# Patient Record
Sex: MALE | Race: WHITE | NOT HISPANIC OR LATINO | Employment: FULL TIME | URBAN - METROPOLITAN AREA
[De-identification: names, ages, dates, MRNs, and addresses within clinical notes are randomized per-mention and may not be internally consistent; named-entity substitution may affect disease eponyms.]

---

## 2022-10-03 ENCOUNTER — APPOINTMENT (OUTPATIENT)
Dept: RADIOLOGY | Facility: MEDICAL CENTER | Age: 52
DRG: 434 | End: 2022-10-03
Attending: HOSPITALIST
Payer: COMMERCIAL

## 2022-10-03 ENCOUNTER — HOSPITAL ENCOUNTER (INPATIENT)
Facility: MEDICAL CENTER | Age: 52
LOS: 1 days | DRG: 434 | End: 2022-10-05
Attending: EMERGENCY MEDICINE | Admitting: HOSPITALIST
Payer: COMMERCIAL

## 2022-10-03 ENCOUNTER — APPOINTMENT (OUTPATIENT)
Dept: RADIOLOGY | Facility: MEDICAL CENTER | Age: 52
DRG: 434 | End: 2022-10-03
Attending: EMERGENCY MEDICINE
Payer: COMMERCIAL

## 2022-10-03 DIAGNOSIS — K76.82 HEPATIC ENCEPHALOPATHY (HCC): ICD-10-CM

## 2022-10-03 DIAGNOSIS — K85.90 ACUTE PANCREATITIS WITHOUT INFECTION OR NECROSIS, UNSPECIFIED PANCREATITIS TYPE: ICD-10-CM

## 2022-10-03 DIAGNOSIS — K70.31 ASCITES DUE TO ALCOHOLIC CIRRHOSIS (HCC): ICD-10-CM

## 2022-10-03 DIAGNOSIS — K74.60 CIRRHOSIS OF LIVER WITH ASCITES, UNSPECIFIED HEPATIC CIRRHOSIS TYPE (HCC): ICD-10-CM

## 2022-10-03 DIAGNOSIS — K70.31 ALCOHOLIC CIRRHOSIS OF LIVER WITH ASCITES (HCC): ICD-10-CM

## 2022-10-03 DIAGNOSIS — R18.8 CIRRHOSIS OF LIVER WITH ASCITES, UNSPECIFIED HEPATIC CIRRHOSIS TYPE (HCC): ICD-10-CM

## 2022-10-03 DIAGNOSIS — R41.0 CONFUSION: ICD-10-CM

## 2022-10-03 PROBLEM — K72.90 DECOMPENSATED LIVER DISEASE (HCC): Status: ACTIVE | Noted: 2022-10-03

## 2022-10-03 PROBLEM — K74.69 DECOMPENSATED LIVER DISEASE (HCC): Status: ACTIVE | Noted: 2022-10-03

## 2022-10-03 LAB
ALBUMIN SERPL BCP-MCNC: 2.9 G/DL (ref 3.2–4.9)
ALBUMIN SERPL BCP-MCNC: 3.1 G/DL (ref 3.2–4.9)
ALBUMIN/GLOB SERPL: 0.7 G/DL
ALP SERPL-CCNC: 77 U/L (ref 30–99)
ALT SERPL-CCNC: 17 U/L (ref 2–50)
AMMONIA PLAS-SCNC: 16 UMOL/L (ref 11–45)
ANION GAP SERPL CALC-SCNC: 10 MMOL/L (ref 7–16)
AST SERPL-CCNC: 40 U/L (ref 12–45)
BASOPHILS # BLD AUTO: 0.9 % (ref 0–1.8)
BASOPHILS # BLD: 0.09 K/UL (ref 0–0.12)
BILIRUB SERPL-MCNC: 3 MG/DL (ref 0.1–1.5)
BUN SERPL-MCNC: 12 MG/DL (ref 8–22)
CALCIUM SERPL-MCNC: 9.2 MG/DL (ref 8.5–10.5)
CHLORIDE SERPL-SCNC: 97 MMOL/L (ref 96–112)
CO2 SERPL-SCNC: 25 MMOL/L (ref 20–33)
CREAT SERPL-MCNC: 0.88 MG/DL (ref 0.5–1.4)
EKG IMPRESSION: NORMAL
EOSINOPHIL # BLD AUTO: 0.19 K/UL (ref 0–0.51)
EOSINOPHIL NFR BLD: 2 % (ref 0–6.9)
ERYTHROCYTE [DISTWIDTH] IN BLOOD BY AUTOMATED COUNT: 51.9 FL (ref 35.9–50)
GFR SERPLBLD CREATININE-BSD FMLA CKD-EPI: 104 ML/MIN/1.73 M 2
GLOBULIN SER CALC-MCNC: 4 G/DL (ref 1.9–3.5)
GLUCOSE SERPL-MCNC: 112 MG/DL (ref 65–99)
HCT VFR BLD AUTO: 39.9 % (ref 42–52)
HGB BLD-MCNC: 13.8 G/DL (ref 14–18)
IMM GRANULOCYTES # BLD AUTO: 0.03 K/UL (ref 0–0.11)
IMM GRANULOCYTES NFR BLD AUTO: 0.3 % (ref 0–0.9)
INR PPP: 1.5 (ref 0.87–1.13)
LIPASE SERPL-CCNC: 181 U/L (ref 11–82)
LYMPHOCYTES # BLD AUTO: 2.1 K/UL (ref 1–4.8)
LYMPHOCYTES NFR BLD: 21.6 % (ref 22–41)
MAGNESIUM SERPL-MCNC: 1.8 MG/DL (ref 1.5–2.5)
MCH RBC QN AUTO: 37.9 PG (ref 27–33)
MCHC RBC AUTO-ENTMCNC: 34.6 G/DL (ref 33.7–35.3)
MCV RBC AUTO: 109.6 FL (ref 81.4–97.8)
MONOCYTES # BLD AUTO: 0.9 K/UL (ref 0–0.85)
MONOCYTES NFR BLD AUTO: 9.2 % (ref 0–13.4)
NEUTROPHILS # BLD AUTO: 6.43 K/UL (ref 1.82–7.42)
NEUTROPHILS NFR BLD: 66 % (ref 44–72)
NRBC # BLD AUTO: 0 K/UL
NRBC BLD-RTO: 0 /100 WBC
PLATELET # BLD AUTO: 152 K/UL (ref 164–446)
PMV BLD AUTO: 9.2 FL (ref 9–12.9)
POTASSIUM SERPL-SCNC: 4.3 MMOL/L (ref 3.6–5.5)
PROT SERPL-MCNC: 6.9 G/DL (ref 6–8.2)
PROTHROMBIN TIME: 17.8 SEC (ref 12–14.6)
RBC # BLD AUTO: 3.64 M/UL (ref 4.7–6.1)
SODIUM SERPL-SCNC: 132 MMOL/L (ref 135–145)
TROPONIN T SERPL-MCNC: 11 NG/L (ref 6–19)
WBC # BLD AUTO: 9.7 K/UL (ref 4.8–10.8)

## 2022-10-03 PROCEDURE — A9270 NON-COVERED ITEM OR SERVICE: HCPCS | Performed by: EMERGENCY MEDICINE

## 2022-10-03 PROCEDURE — 700102 HCHG RX REV CODE 250 W/ 637 OVERRIDE(OP): Performed by: HOSPITALIST

## 2022-10-03 PROCEDURE — 82040 ASSAY OF SERUM ALBUMIN: CPT

## 2022-10-03 PROCEDURE — 96375 TX/PRO/DX INJ NEW DRUG ADDON: CPT

## 2022-10-03 PROCEDURE — 99285 EMERGENCY DEPT VISIT HI MDM: CPT

## 2022-10-03 PROCEDURE — 96374 THER/PROPH/DIAG INJ IV PUSH: CPT

## 2022-10-03 PROCEDURE — 85610 PROTHROMBIN TIME: CPT

## 2022-10-03 PROCEDURE — 71045 X-RAY EXAM CHEST 1 VIEW: CPT

## 2022-10-03 PROCEDURE — 93005 ELECTROCARDIOGRAM TRACING: CPT | Performed by: EMERGENCY MEDICINE

## 2022-10-03 PROCEDURE — 80053 COMPREHEN METABOLIC PANEL: CPT

## 2022-10-03 PROCEDURE — 93005 ELECTROCARDIOGRAM TRACING: CPT

## 2022-10-03 PROCEDURE — 82140 ASSAY OF AMMONIA: CPT

## 2022-10-03 PROCEDURE — G0378 HOSPITAL OBSERVATION PER HR: HCPCS

## 2022-10-03 PROCEDURE — A9270 NON-COVERED ITEM OR SERVICE: HCPCS | Performed by: HOSPITALIST

## 2022-10-03 PROCEDURE — 700102 HCHG RX REV CODE 250 W/ 637 OVERRIDE(OP): Performed by: EMERGENCY MEDICINE

## 2022-10-03 PROCEDURE — 36415 COLL VENOUS BLD VENIPUNCTURE: CPT

## 2022-10-03 PROCEDURE — 84484 ASSAY OF TROPONIN QUANT: CPT

## 2022-10-03 PROCEDURE — 99220 PR INITIAL OBSERVATION CARE,LEVL III: CPT | Performed by: HOSPITALIST

## 2022-10-03 PROCEDURE — 85025 COMPLETE CBC W/AUTO DIFF WBC: CPT

## 2022-10-03 PROCEDURE — 700111 HCHG RX REV CODE 636 W/ 250 OVERRIDE (IP): Performed by: HOSPITALIST

## 2022-10-03 PROCEDURE — 83690 ASSAY OF LIPASE: CPT

## 2022-10-03 PROCEDURE — 83735 ASSAY OF MAGNESIUM: CPT

## 2022-10-03 RX ORDER — FUROSEMIDE 10 MG/ML
40 INJECTION INTRAMUSCULAR; INTRAVENOUS
Status: DISCONTINUED | OUTPATIENT
Start: 2022-10-03 | End: 2022-10-05 | Stop reason: HOSPADM

## 2022-10-03 RX ORDER — PROMETHAZINE HYDROCHLORIDE 25 MG/1
12.5-25 SUPPOSITORY RECTAL EVERY 4 HOURS PRN
Status: DISCONTINUED | OUTPATIENT
Start: 2022-10-03 | End: 2022-10-05 | Stop reason: HOSPADM

## 2022-10-03 RX ORDER — POLYETHYLENE GLYCOL 3350 17 G/17G
1 POWDER, FOR SOLUTION ORAL
Status: DISCONTINUED | OUTPATIENT
Start: 2022-10-03 | End: 2022-10-05 | Stop reason: HOSPADM

## 2022-10-03 RX ORDER — CIPROFLOXACIN 500 MG/1
500 TABLET, FILM COATED ORAL DAILY
Status: ON HOLD | COMMUNITY
Start: 2022-09-24 | End: 2022-10-05

## 2022-10-03 RX ORDER — FUROSEMIDE 40 MG/1
40 TABLET ORAL DAILY
Status: ON HOLD | COMMUNITY
End: 2022-10-05 | Stop reason: SDUPTHER

## 2022-10-03 RX ORDER — ONDANSETRON 4 MG/1
4 TABLET, ORALLY DISINTEGRATING ORAL EVERY 4 HOURS PRN
Status: DISCONTINUED | OUTPATIENT
Start: 2022-10-03 | End: 2022-10-05 | Stop reason: HOSPADM

## 2022-10-03 RX ORDER — AMOXICILLIN 250 MG
2 CAPSULE ORAL 2 TIMES DAILY
Status: DISCONTINUED | OUTPATIENT
Start: 2022-10-03 | End: 2022-10-05 | Stop reason: HOSPADM

## 2022-10-03 RX ORDER — LACTULOSE 20 G/30ML
30 SOLUTION ORAL ONCE
Status: COMPLETED | OUTPATIENT
Start: 2022-10-03 | End: 2022-10-03

## 2022-10-03 RX ORDER — BISACODYL 10 MG
10 SUPPOSITORY, RECTAL RECTAL
Status: DISCONTINUED | OUTPATIENT
Start: 2022-10-03 | End: 2022-10-05 | Stop reason: HOSPADM

## 2022-10-03 RX ORDER — MIDODRINE HYDROCHLORIDE 5 MG/1
5 TABLET ORAL 3 TIMES DAILY
Status: DISCONTINUED | OUTPATIENT
Start: 2022-10-03 | End: 2022-10-05 | Stop reason: HOSPADM

## 2022-10-03 RX ORDER — SPIRONOLACTONE 100 MG/1
100 TABLET, FILM COATED ORAL DAILY
Status: ON HOLD | COMMUNITY
End: 2022-10-05 | Stop reason: SDUPTHER

## 2022-10-03 RX ORDER — ONDANSETRON 2 MG/ML
4 INJECTION INTRAMUSCULAR; INTRAVENOUS EVERY 4 HOURS PRN
Status: DISCONTINUED | OUTPATIENT
Start: 2022-10-03 | End: 2022-10-05 | Stop reason: HOSPADM

## 2022-10-03 RX ORDER — SPIRONOLACTONE 25 MG/1
100 TABLET ORAL DAILY
Status: DISCONTINUED | OUTPATIENT
Start: 2022-10-03 | End: 2022-10-05 | Stop reason: HOSPADM

## 2022-10-03 RX ORDER — OXYCODONE HYDROCHLORIDE 5 MG/1
5 TABLET ORAL EVERY 4 HOURS PRN
Status: DISCONTINUED | OUTPATIENT
Start: 2022-10-03 | End: 2022-10-05 | Stop reason: HOSPADM

## 2022-10-03 RX ORDER — PROMETHAZINE HYDROCHLORIDE 25 MG/1
12.5-25 TABLET ORAL EVERY 4 HOURS PRN
Status: DISCONTINUED | OUTPATIENT
Start: 2022-10-03 | End: 2022-10-05 | Stop reason: HOSPADM

## 2022-10-03 RX ORDER — PROCHLORPERAZINE EDISYLATE 5 MG/ML
5-10 INJECTION INTRAMUSCULAR; INTRAVENOUS EVERY 4 HOURS PRN
Status: DISCONTINUED | OUTPATIENT
Start: 2022-10-03 | End: 2022-10-05 | Stop reason: HOSPADM

## 2022-10-03 RX ORDER — ALBUTEROL SULFATE 90 UG/1
2 AEROSOL, METERED RESPIRATORY (INHALATION) EVERY 4 HOURS PRN
Status: DISCONTINUED | OUTPATIENT
Start: 2022-10-03 | End: 2022-10-05 | Stop reason: HOSPADM

## 2022-10-03 RX ORDER — MIDODRINE HYDROCHLORIDE 5 MG/1
5 TABLET ORAL 3 TIMES DAILY
Status: ON HOLD | COMMUNITY
End: 2022-10-05 | Stop reason: SDUPTHER

## 2022-10-03 RX ADMIN — LACTULOSE 30 ML: 20 SOLUTION ORAL at 18:49

## 2022-10-03 RX ADMIN — SPIRONOLACTONE 100 MG: 25 TABLET ORAL at 18:47

## 2022-10-03 RX ADMIN — RIFAXIMIN 550 MG: 550 TABLET ORAL at 22:21

## 2022-10-03 RX ADMIN — MIDODRINE HYDROCHLORIDE 5 MG: 5 TABLET ORAL at 18:49

## 2022-10-03 RX ADMIN — OXYCODONE 5 MG: 5 TABLET ORAL at 18:48

## 2022-10-03 RX ADMIN — OXYCODONE 5 MG: 5 TABLET ORAL at 23:04

## 2022-10-03 RX ADMIN — SENNOSIDES AND DOCUSATE SODIUM 2 TABLET: 50; 8.6 TABLET ORAL at 18:47

## 2022-10-03 RX ADMIN — FUROSEMIDE 40 MG: 10 INJECTION, SOLUTION INTRAMUSCULAR; INTRAVENOUS at 18:49

## 2022-10-03 RX ADMIN — ONDANSETRON 4 MG: 2 INJECTION INTRAMUSCULAR; INTRAVENOUS at 23:04

## 2022-10-03 ASSESSMENT — ENCOUNTER SYMPTOMS
FEVER: 0
HEADACHES: 0
CONSTIPATION: 0
TINGLING: 0
CHILLS: 0
FLANK PAIN: 0
DEPRESSION: 0
ABDOMINAL PAIN: 1
HEMOPTYSIS: 0
WEAKNESS: 0
VOMITING: 0
PALPITATIONS: 0
BRUISES/BLEEDS EASILY: 0
BLURRED VISION: 0
NAUSEA: 0
POLYDIPSIA: 0
DIZZINESS: 0
SHORTNESS OF BREATH: 0
WHEEZING: 0
FALLS: 0
HEARTBURN: 0
STRIDOR: 0
BLOOD IN STOOL: 0
PHOTOPHOBIA: 0
COUGH: 0
PND: 0
SINUS PAIN: 0
DIARRHEA: 0
BACK PAIN: 0
EYE PAIN: 0
MYALGIAS: 0
DIAPHORESIS: 0
SORE THROAT: 0
SPUTUM PRODUCTION: 0
CLAUDICATION: 0
ORTHOPNEA: 0
HALLUCINATIONS: 0
NECK PAIN: 0
TREMORS: 0
DOUBLE VISION: 0

## 2022-10-03 ASSESSMENT — LIFESTYLE VARIABLES
EVER HAD A DRINK FIRST THING IN THE MORNING TO STEADY YOUR NERVES TO GET RID OF A HANGOVER: NO
TOTAL SCORE: 0
EVER HAD A DRINK FIRST THING IN THE MORNING TO STEADY YOUR NERVES TO GET RID OF A HANGOVER: NO
TOTAL SCORE: 0
ON A TYPICAL DAY WHEN YOU DRINK ALCOHOL HOW MANY DRINKS DO YOU HAVE: 0
EVER FELT BAD OR GUILTY ABOUT YOUR DRINKING: NO
SUBSTANCE_ABUSE: 0
HAVE YOU EVER FELT YOU SHOULD CUT DOWN ON YOUR DRINKING: NO
TOTAL SCORE: 0
TOTAL SCORE: 0
DO YOU DRINK ALCOHOL: NO
EVER FELT BAD OR GUILTY ABOUT YOUR DRINKING: NO
ALCOHOL_USE: NO
EVER FELT BAD OR GUILTY ABOUT YOUR DRINKING: NO
CONSUMPTION TOTAL: INCOMPLETE
DOES PATIENT WANT TO STOP DRINKING: NO
CONSUMPTION TOTAL: NEGATIVE
ALCOHOL_USE: NO
TOTAL SCORE: 0
HAVE YOU EVER FELT YOU SHOULD CUT DOWN ON YOUR DRINKING: NO
TOTAL SCORE: 0
EVER HAD A DRINK FIRST THING IN THE MORNING TO STEADY YOUR NERVES TO GET RID OF A HANGOVER: NO
TOTAL SCORE: 0
HOW MANY TIMES IN THE PAST YEAR HAVE YOU HAD 5 OR MORE DRINKS IN A DAY: 0
HAVE YOU EVER FELT YOU SHOULD CUT DOWN ON YOUR DRINKING: NO
DOES PATIENT WANT TO STOP DRINKING: NO
TOTAL SCORE: 0
TOTAL SCORE: 0
HAVE PEOPLE ANNOYED YOU BY CRITICIZING YOUR DRINKING: NO
HAVE PEOPLE ANNOYED YOU BY CRITICIZING YOUR DRINKING: NO
AVERAGE NUMBER OF DAYS PER WEEK YOU HAVE A DRINK CONTAINING ALCOHOL: 0
HAVE PEOPLE ANNOYED YOU BY CRITICIZING YOUR DRINKING: NO
CONSUMPTION TOTAL: INCOMPLETE

## 2022-10-03 ASSESSMENT — PATIENT HEALTH QUESTIONNAIRE - PHQ9
1. LITTLE INTEREST OR PLEASURE IN DOING THINGS: NOT AT ALL
2. FEELING DOWN, DEPRESSED, IRRITABLE, OR HOPELESS: NOT AT ALL
SUM OF ALL RESPONSES TO PHQ9 QUESTIONS 1 AND 2: 0

## 2022-10-03 ASSESSMENT — PAIN DESCRIPTION - PAIN TYPE
TYPE: CHRONIC PAIN
TYPE: CHRONIC PAIN

## 2022-10-03 ASSESSMENT — FIBROSIS 4 INDEX: FIB4 SCORE: 3.19

## 2022-10-03 NOTE — ED NOTES
Med Rec Complete per patient, patient's spouse at bedside & Rx bottles  Allergies Reviewed with patient  Patient's Preferred Pharmacy: [facility] outpatient pharmacy ([facility])  [facility] [address]   & [address]      Patient recently started a once daily Ciprofloxacin 500mg tablet on 9/24/22, with unspecified duration of therapy.  The Rx bottle gives the date filled as 9/24/22 with 90 days of tablets in bottle.  The patient was unsure how long this therapy would last and why he was on this medication currently.     The patient reports that he would like any discharge medications brought to him via Meds to Beds upon discharge.

## 2022-10-03 NOTE — ED TRIAGE NOTES
"Chief Complaint   Patient presents with    Abdominal Pain     \"I have a liver problem they said when I was in [place]. Got back last night.\" Pt has abdominal pain and swelling, testicular pain. Pt diagnosed with decompensated cirrhosis from [facility].     Pt educated upon triage process and told to inform  staff of any changes in condition so that Pt may be reassessed. No further questions at this time. Pt sitting out in lobby.    "

## 2022-10-03 NOTE — H&P
"Hospital Medicine History & Physical Note    Date of Service  10/3/2022    Primary Care Physician  No primary care provider on file.    Consultants      Specialist Names: none    Code Status  Full Code    Chief Complaint  Chief Complaint   Patient presents with    Abdominal Pain     \"I have a liver problem they said when I was in [place]. Got back last night.\" Pt has abdominal pain and swelling, testicular pain. Pt diagnosed with decompensated cirrhosis from [facility].       History of Presenting Illness  Rodolfo Billings is a 51 y.o. male who presented 10/3/2022 with past medical history of alcohol abuse, hepatic cirrhosis who comes into the emergency room for abdominal pain, distention and confusion.  Patient originally thought it was pain related to gas when he noticed his abdomen becoming more distended 3 weeks prior.  Patient was recently admitted at [facility] where he was found to have liver cirrhosis.  He had extensive work-up at that time including paracentesis, negative hepatitis panel, negative anti-smooth muscle antibody, positive DAKOTA.  He did have low ceruloplasmin levels.  Paracentesis did not show any evidence of SBP.  He had 2 different paracentesis.  Cardiac echo was negative for any acute findings.  He was discharged on ciprofloxacin, Lasix, Aldactone.  Work-up for malignancy was negative.  Patient wanted to be discharged from [place].  Last night patient complained that he was confused and having tremors.  Patient states that in his 20s he drank hard alcohol.  Patient denies any high salt intake.  After 36 he started drinking 6 beers a day but quit 3 months prior.  States that he needs to establish himself with a primary care doctor and a gastroenterologist in [place].    I discussed the plan of care with patient.    Review of Systems  Review of Systems   Constitutional:  Positive for malaise/fatigue. Negative for chills, diaphoresis and fever.   HENT:  Negative " for congestion, ear discharge, ear pain, hearing loss, nosebleeds, sinus pain, sore throat and tinnitus.    Eyes:  Negative for blurred vision, double vision, photophobia and pain.   Respiratory:  Negative for cough, hemoptysis, sputum production, shortness of breath, wheezing and stridor.    Cardiovascular:  Positive for leg swelling. Negative for chest pain, palpitations, orthopnea, claudication and PND.   Gastrointestinal:  Positive for abdominal pain. Negative for blood in stool, constipation, diarrhea, heartburn, melena, nausea and vomiting.   Genitourinary:  Negative for dysuria, flank pain, frequency, hematuria and urgency.   Musculoskeletal:  Negative for back pain, falls, joint pain, myalgias and neck pain.   Skin:  Negative for itching and rash.   Neurological:  Negative for dizziness, tingling, tremors, weakness and headaches.   Endo/Heme/Allergies:  Negative for environmental allergies and polydipsia. Does not bruise/bleed easily.   Psychiatric/Behavioral:  Negative for depression, hallucinations, substance abuse and suicidal ideas.      Past Medical History  Medical history reviewed and not pertinent    Surgical History  Surgical history reviewed and not pertinent    Family History  family history is not on file.   Family history reviewed with patient. There is no family history that is pertinent to the chief complaint.     Social History   reports that he has been smoking cigarettes. He has never used smokeless tobacco. He reports that he does not currently use alcohol. He reports current drug use. Drug: Oral.    Allergies  No Known Allergies    Medications  Prior to Admission Medications   Prescriptions Last Dose Informant Patient Reported? Taking?   ciprofloxacin (CIPRO) 500 MG Tab 10/2/2022 at AM Significant Other Yes Yes   Sig: Take 500 mg by mouth every day. 90 day course   furosemide (LASIX) 40 MG Tab 10/3/2022 at AM Significant Other Yes Yes   Sig: Take 40 mg by mouth every day.   midodrine  (PROAMATINE) 5 MG Tab 10/3/2022 at AM Significant Other Yes Yes   Sig: Take 5 mg by mouth 3 times a day.   spironolactone (ALDACTONE) 100 MG Tab 10/3/2022 at AM Significant Other Yes Yes   Sig: Take 100 mg by mouth every day.      Facility-Administered Medications: None       Physical Exam  Temp:  [37.4 °C (99.3 °F)] 37.4 °C (99.3 °F)  Pulse:  [] 90  Resp:  [21-22] 22  BP: ()/(66-88) 99/66  SpO2:  [97 %-98 %] 97 %  Blood Pressure: (!) 99/66   Temperature: 37.4 °C (99.3 °F)   Pulse: 90   Respiration: (!) 22   Pulse Oximetry: 97 %       Physical Exam  Vitals and nursing note reviewed.   Constitutional:       General: He is not in acute distress.     Appearance: Normal appearance. He is not ill-appearing, toxic-appearing or diaphoretic.   HENT:      Head: Normocephalic and atraumatic.      Nose: No congestion or rhinorrhea.      Mouth/Throat:      Pharynx: No posterior oropharyngeal erythema.   Eyes:      General: No scleral icterus.        Right eye: No discharge.   Neck:      Vascular: Hepatojugular reflux and JVD present.   Cardiovascular:      Rate and Rhythm: Normal rate and regular rhythm.      Pulses: Normal pulses.      Heart sounds: Normal heart sounds. No murmur heard.    No friction rub. No gallop.   Pulmonary:      Effort: Pulmonary effort is normal. No respiratory distress.      Breath sounds: Normal breath sounds. No stridor. No wheezing, rhonchi or rales.   Abdominal:      General: There is distension.      Tenderness: There is abdominal tenderness.   Musculoskeletal:         General: No swelling, tenderness, deformity or signs of injury.      Cervical back: Normal range of motion.      Right lower leg: Edema present.      Left lower leg: Edema present.   Skin:     Capillary Refill: Capillary refill takes more than 3 seconds.      Coloration: Skin is not jaundiced or pale.      Findings: No bruising, erythema, lesion or rash.   Neurological:      General: No focal deficit present.      Mental  Status: He is alert and oriented to person, place, and time.       Laboratory:  Recent Labs     10/03/22  1142   WBC 9.7   RBC 3.64*   HEMOGLOBIN 13.8*   HEMATOCRIT 39.9*   .6*   MCH 37.9*   MCHC 34.6   RDW 51.9*   PLATELETCT 152*   MPV 9.2     Recent Labs     10/03/22  1142   SODIUM 132*   POTASSIUM 4.3   CHLORIDE 97   CO2 25   GLUCOSE 112*   BUN 12   CREATININE 0.88   CALCIUM 9.2     Recent Labs     10/03/22  1142   ALTSGPT 17   ASTSGOT 40   ALKPHOSPHAT 77   TBILIRUBIN 3.0*   LIPASE 181*   GLUCOSE 112*     Recent Labs     10/03/22  1142   INR 1.50*     No results for input(s): NTPROBNP in the last 72 hours.      Recent Labs     10/03/22  1142   TROPONINT 11       Imaging:  DX-CHEST-PORTABLE (1 VIEW)   Final Result      1.  Minimal bibasilar atelectasis.   2.  No pneumonia or pneumothorax.      US-PARACENTESIS, ABD WITH IMAGING    (Results Pending)       X-Ray:  I have personally reviewed the images and compared with prior images.    Assessment/Plan:  Justification for Admission Status  I anticipate this patient will require at least two midnights for appropriate medical management, necessitating inpatient admission because decompensated liver cirrhosis    Patient will need a Med/Surg bed on MEDICAL service .  The need is secondary to decompensated liver cirrhosis.    * Decompensated liver disease (HCC)- (present on admission)  Assessment & Plan  Secondary to alcohol abuse  Check ammonia  MELD 19  Continue monitoring  Start IV lasix and continue home Aldactone  Monitor weight and strict ins and outs  Patient will need an outpatient gastroenterologist    Hepatic encephalopathy  Assessment & Plan  Start rifaximin  Start lactulose    Ascites due to alcoholic cirrhosis (HCC)  Assessment & Plan  Ordered paracentesis      VTE prophylaxis: SCDs/TEDs

## 2022-10-03 NOTE — ED PROVIDER NOTES
"ED Provider Note    Scribed for Alfredo Guerrero M.D. by Margareth Mathews. 10/3/2022  2:24 PM    Primary care provider: None reported.  Means of arrival: Walk-In  History obtained from: Patient  History limited by: None    CHIEF COMPLAINT  Chief Complaint   Patient presents with    Abdominal Pain     \"I have a liver problem they said when I was in [place]. Got back last night.\" Pt has abdominal pain and swelling, testicular pain. Pt diagnosed with decompensated cirrhosis from [facility].     HPI  Rodolfo Billings is a 51 y.o. male with history of decompensated cirrhosis who presents to the Emergency Department for evaluation of worsening abdominal pain onset 3 weeks ago. He describes his pain as overwhelming. He states he has been travelling a lot recently with multiple cross-country flights and originally attributed his pain to gas secondary to eating some bad food. However, he was recently admitted to the [facility] for cirrhosis and was told he was going to die. He states he did not like the treatment he received, prompting him to come to he ED today after he returned home to [place] for further evaluation. He notes last night he was seeing things in the closet that were not there. Wife at bedside reports increased confusion, noting he thought he was at the \"[facility]\" instead of the \"[facility]\". He admits to associated symptoms of abdominal swelling, testicular pain, increased fatigue, insomnia,  and worsening bilateral leg swelling but denies constipation, melena, or hematemesis. He reports he was admitted while in [place] with decompensated cirrhosis secondary to alcohol use. He reports he was newly prescribed Midodrine, Cipro, lasix, and spironolactone. No alleviating factors were reported. He reports he used to drink 6 beers per day, but notes his last drink of alcohol was July 9. He reports he is not currently followed by a PCP.    REVIEW OF " "SYSTEMS  Pertinent positives include: abdominal pain, abdominal swelling, testicular pain, increased fatigue, insomnia, bilateral leg swelling, and increased confusion.   Pertinent negatives include: constipation, melena, or hematemesis.   See history of present illness. All other systems are negative.     PAST MEDICAL HISTORY   Decompensated cirrhosis    SURGICAL HISTORY  patient denies any surgical history    SOCIAL HISTORY  Social History     Tobacco Use    Smoking status: Every Day     Packs/day: 0.50     Types: Cigarettes    Smokeless tobacco: Never   Substance Use Topics    Alcohol use: Not Currently     Comment: \"Quit drinking x3.5 months ago\"    Drug use: Yes     Types: Oral     Comment: THC gummies      Social History     Substance and Sexual Activity   Drug Use Yes    Types: Oral    Comment: THC gummies       FAMILY HISTORY  History reviewed. No pertinent family history.    CURRENT MEDICATIONS  Midodrine, Cipro, lasix, and spironolactone    ALLERGIES  No Known Allergies    PHYSICAL EXAM  VITAL SIGNS: BP (!) 99/66   Pulse 90   Temp 37.4 °C (99.3 °F) (Temporal)   Resp (!) 22   Ht 1.905 m (6' 3\")   Wt 108 kg (238 lb 15.7 oz)   SpO2 97%   BMI 29.87 kg/m²       Constitutional: Alert in no apparent distress.  HENT: No signs of trauma, Bilateral external ears normal, Nose normal. Uvula midline.   Eyes: Pupils are equal and reactive, Conjunctiva normal, Non-icteric.   Neck: Normal range of motion, No tenderness, Supple, No stridor.   Lymphatic: No lymphadenopathy noted.   Cardiovascular: Regular rate and rhythm, no murmurs.   Thorax & Lungs: Normal breath sounds, No respiratory distress, No wheezing, No chest tenderness.   Abdomen:  Diffuse abdominal swelling.   Skin: Warm, Dry, No erythema, No rash.   Back: No bony tenderness, No CVA tenderness.   Extremities: Intact distal pulses, No edema, No tenderness, No cyanosis.  Musculoskeletal: Good range of motion in all major joints. No tenderness to palpation " or major deformities noted.   Neurologic: Alert , Normal motor function, Normal sensory function, No focal deficits noted. No asterixis. Moves all extremities. 5/5 strength x4.  Psychiatric: Affect normal, Judgment normal, Mood normal.     DIAGNOSTIC STUDIES / PROCEDURES    LABS  Labs Reviewed   CBC WITH DIFFERENTIAL - Abnormal; Notable for the following components:       Result Value    RBC 3.64 (*)     Hemoglobin 13.8 (*)     Hematocrit 39.9 (*)     .6 (*)     MCH 37.9 (*)     RDW 51.9 (*)     Platelet Count 152 (*)     Lymphocytes 21.60 (*)     Monos (Absolute) 0.90 (*)     All other components within normal limits    Narrative:     Biotin intake of greater than 5 mg per day may interfere with  troponin levels, causing false low values.   COMP METABOLIC PANEL - Abnormal; Notable for the following components:    Sodium 132 (*)     Glucose 112 (*)     Total Bilirubin 3.0 (*)     Albumin 2.9 (*)     Globulin 4.0 (*)     All other components within normal limits    Narrative:     Biotin intake of greater than 5 mg per day may interfere with  troponin levels, causing false low values.   LIPASE - Abnormal; Notable for the following components:    Lipase 181 (*)     All other components within normal limits    Narrative:     Biotin intake of greater than 5 mg per day may interfere with  troponin levels, causing false low values.   PROTHROMBIN TIME - Abnormal; Notable for the following components:    PT 17.8 (*)     INR 1.50 (*)     All other components within normal limits    Narrative:     Indicate which anticoagulants the patient is on:->NONE   ALBUMIN - Abnormal; Notable for the following components:    Albumin 3.1 (*)     All other components within normal limits    Narrative:     Peritoneal Fluid   TROPONIN    Narrative:     Biotin intake of greater than 5 mg per day may interfere with  troponin levels, causing false low values.   ESTIMATED GFR    Narrative:     Biotin intake of greater than 5 mg per day may  interfere with  troponin levels, causing false low values.   AMMONIA   MAGNESIUM    Narrative:     Biotin intake of greater than 5 mg per day may interfere with  troponin levels, causing false low values.   URINALYSIS   FLUID CELL COUNT    Narrative:     Peritoneal Fluid   FLUID TOTAL PROTEIN    Narrative:     Peritoneal Fluid   FLUID CULTURE W/GRAM STAIN      All labs reviewed by me.    EKG   Report   Date Value Ref Range Status   10/03/2022       [facility]    Test Date:  2022-10-03  Pt Name:    ELVIRA SETH              Department: ER  MRN:        [id number]                      Room:        28  Gender:     Male                         Technician: [id number]  :        1970                   Requested By:ER TRIAGE PROTOCOL  Order #:    [id number]                    Reading MD:    Measurements  Intervals                                Axis  Rate:       93                           P:          44  ID:         [id number]                          QRS:        22  QRSD:       101                          T:          47  QT:         395  QTc:        492    Interpretive Statements  Sinus rhythm  Borderline prolonged QT interval  No previous ECG available for comparison               12 Lead EKG reviewed and interpreted by me as above.    RADIOLOGY  DX-CHEST-PORTABLE (1 VIEW)   Final Result      1.  Minimal bibasilar atelectasis.   2.  No pneumonia or pneumothorax.      US-PARACENTESIS, ABD WITH IMAGING    (Results Pending)     The radiologist's interpretation of all radiological studies have been reviewed by me.    COURSE & MEDICAL DECISION MAKING  Nursing notes, VS, PMSFHx reviewed in chart.    Review of patient medical records reveal on  at outside hospital, his sodium was 128. 21st. He had an echo w/ normal LV function. His last INR was on  and was 2.     51 y.o. male p/w chief complaint of worsening abdominal pain, abdominal swelling, testicular pain, increased fatigue, and  bilateral leg swelling, and recent diagnosis of decompensated cirrhosis.    2:24 PM Patient seen and examined at bedside.  Ordered for EKG, Troponin, UA, lipase, CMP, CBC w/ diff, and PT/INR.     I verified that the patient was wearing a mask and I was wearing appropriate PPE every time I entered the room. The patient's mask was on the patient at all times during my encounter except for a brief view of the oropharynx.     The differential diagnoses include but are not limited to:     #cirrhosis of liver with ascites  Patient with improving cirrhosis from prior hospitalization however persistent confusion    #acute pancreatitis  Mild elevation in lipase at this time and pending ultrasound results from outside hospital  Patient denies any recent EtOH usage since July    #confusion  Confusion likely secondary to hepatic encephalopathy and given lactulose in emergency department    2:42 PM Patient was reevaluated at bedside. Patient reports he had an ultrasound of his abdomen and many liters of fluid removed from his abdomen at [facility].     3:02 PM Patient was reevaluated at bedside. I discussed the patient's diagnostic study results and informed them of 20% chance of dying in the next 3 months.  I informed the patient to return to the ER for fever, melena, hematemesis, abdominal distension, and increased confusion. Ordered for Ammonia. Andres Hospitalist.     MELD Score: 19    3:14 PM  Patient will be treated with lactulose 20 gm/ 30 mL solution for his confusion.  I reevaluated the patient at bedside. I informed the patient of my plan to admit today given the patient's current presentation and diagnostic study results. Patient verbalizes understanding and support with my plan for admission.     3:39 PM I discussed the patient's case and the above findings with Dr. Domínguez (Hospitalist) who will assess the patient for hospitalization.       DISPOSITION:  Patient will be hospitalized by Dr. Domínguez  in guarded condition.    FINAL IMPRESSION  1. Acute pancreatitis without infection or necrosis, unspecified pancreatitis type    2. Confusion    3. Cirrhosis of liver with ascites, unspecified hepatic cirrhosis type (HCC)    4. Hepatic encephalopathy          Margareth MILNER (Scribe), am scribing for, and in the presence of, Alfredo Guerrero M.D..    Electronically signed by: Margareth Mathews (Scribe), 10/3/2022    IAlfredo M.D. personally performed the services described in this documentation, as scribed by Margareth Mathews in my presence, and it is both accurate and complete.    The note accurately reflects work and decisions made by me.  Alfredo Guerrero M.D.  10/3/2022  8:17 PM

## 2022-10-03 NOTE — ED NOTES
Pt resting quietly per cart in nad at this time. Family at bedside. Bed locked, in lowest position and side rails up x 2. Call light in reach. Will continue to monitor.

## 2022-10-04 ENCOUNTER — APPOINTMENT (OUTPATIENT)
Dept: RADIOLOGY | Facility: MEDICAL CENTER | Age: 52
DRG: 434 | End: 2022-10-04
Attending: HOSPITALIST
Payer: COMMERCIAL

## 2022-10-04 PROBLEM — K74.60 LIVER CIRRHOSIS (HCC): Status: ACTIVE | Noted: 2022-10-04

## 2022-10-04 LAB
ALBUMIN SERPL BCP-MCNC: 2.6 G/DL (ref 3.2–4.9)
ALBUMIN/GLOB SERPL: 0.7 G/DL
ALP SERPL-CCNC: 65 U/L (ref 30–99)
ALT SERPL-CCNC: 13 U/L (ref 2–50)
ANION GAP SERPL CALC-SCNC: 11 MMOL/L (ref 7–16)
APPEARANCE FLD: NORMAL
AST SERPL-CCNC: 36 U/L (ref 12–45)
BASOPHILS # BLD AUTO: 1.1 % (ref 0–1.8)
BASOPHILS # BLD: 0.08 K/UL (ref 0–0.12)
BILIRUB SERPL-MCNC: 2.6 MG/DL (ref 0.1–1.5)
BODY FLD TYPE: NORMAL
BODY FLD TYPE: NORMAL
BUN SERPL-MCNC: 12 MG/DL (ref 8–22)
CALCIUM SERPL-MCNC: 8.8 MG/DL (ref 8.5–10.5)
CHLORIDE SERPL-SCNC: 100 MMOL/L (ref 96–112)
CO2 SERPL-SCNC: 21 MMOL/L (ref 20–33)
COLOR FLD: YELLOW
CREAT SERPL-MCNC: 0.82 MG/DL (ref 0.5–1.4)
CSF COMMENTS 1658: NORMAL
EOSINOPHIL # BLD AUTO: 0.21 K/UL (ref 0–0.51)
EOSINOPHIL NFR BLD: 2.8 % (ref 0–6.9)
ERYTHROCYTE [DISTWIDTH] IN BLOOD BY AUTOMATED COUNT: 52.1 FL (ref 35.9–50)
GFR SERPLBLD CREATININE-BSD FMLA CKD-EPI: 106 ML/MIN/1.73 M 2
GLOBULIN SER CALC-MCNC: 3.5 G/DL (ref 1.9–3.5)
GLUCOSE SERPL-MCNC: 97 MG/DL (ref 65–99)
GRAM STN SPEC: NORMAL
HCT VFR BLD AUTO: 36 % (ref 42–52)
HGB BLD-MCNC: 12.5 G/DL (ref 14–18)
IMM GRANULOCYTES # BLD AUTO: 0.03 K/UL (ref 0–0.11)
IMM GRANULOCYTES NFR BLD AUTO: 0.4 % (ref 0–0.9)
LYMPHOCYTES # BLD AUTO: 2.05 K/UL (ref 1–4.8)
LYMPHOCYTES NFR BLD: 27.3 % (ref 22–41)
LYMPHOCYTES NFR FLD: 30 %
MCH RBC QN AUTO: 38.1 PG (ref 27–33)
MCHC RBC AUTO-ENTMCNC: 34.7 G/DL (ref 33.7–35.3)
MCV RBC AUTO: 109.8 FL (ref 81.4–97.8)
MESOTHL CELL NFR FLD: 12 %
MONOCYTES # BLD AUTO: 0.74 K/UL (ref 0–0.85)
MONOCYTES NFR BLD AUTO: 9.8 % (ref 0–13.4)
MONONUC CELLS NFR FLD: 24 %
NEUTROPHILS # BLD AUTO: 4.41 K/UL (ref 1.82–7.42)
NEUTROPHILS NFR BLD: 58.6 % (ref 44–72)
NEUTROPHILS NFR FLD: 34 %
NRBC # BLD AUTO: 0 K/UL
NRBC BLD-RTO: 0 /100 WBC
PLATELET # BLD AUTO: 110 K/UL (ref 164–446)
PMV BLD AUTO: 9.4 FL (ref 9–12.9)
POTASSIUM SERPL-SCNC: 3.8 MMOL/L (ref 3.6–5.5)
PROT FLD-MCNC: 1.6 G/DL
PROT SERPL-MCNC: 6.1 G/DL (ref 6–8.2)
RBC # BLD AUTO: 3.28 M/UL (ref 4.7–6.1)
RBC # FLD: <2000 CELLS/UL
SIGNIFICANT IND 70042: NORMAL
SITE SITE: NORMAL
SODIUM SERPL-SCNC: 132 MMOL/L (ref 135–145)
SOURCE SOURCE: NORMAL
WBC # BLD AUTO: 7.5 K/UL (ref 4.8–10.8)
WBC # FLD: 235 CELLS/UL

## 2022-10-04 PROCEDURE — 87070 CULTURE OTHR SPECIMN AEROBIC: CPT

## 2022-10-04 PROCEDURE — 85025 COMPLETE CBC W/AUTO DIFF WBC: CPT

## 2022-10-04 PROCEDURE — 36415 COLL VENOUS BLD VENIPUNCTURE: CPT

## 2022-10-04 PROCEDURE — 80053 COMPREHEN METABOLIC PANEL: CPT

## 2022-10-04 PROCEDURE — 700111 HCHG RX REV CODE 636 W/ 250 OVERRIDE (IP): Performed by: STUDENT IN AN ORGANIZED HEALTH CARE EDUCATION/TRAINING PROGRAM

## 2022-10-04 PROCEDURE — 770001 HCHG ROOM/CARE - MED/SURG/GYN PRIV*

## 2022-10-04 PROCEDURE — 87015 SPECIMEN INFECT AGNT CONCNTJ: CPT

## 2022-10-04 PROCEDURE — 84157 ASSAY OF PROTEIN OTHER: CPT

## 2022-10-04 PROCEDURE — 87205 SMEAR GRAM STAIN: CPT

## 2022-10-04 PROCEDURE — 96376 TX/PRO/DX INJ SAME DRUG ADON: CPT

## 2022-10-04 PROCEDURE — A9270 NON-COVERED ITEM OR SERVICE: HCPCS | Performed by: HOSPITALIST

## 2022-10-04 PROCEDURE — 4410569 US-PARACENTESIS, ABD WITH IMAGING

## 2022-10-04 PROCEDURE — 0W9G3ZZ DRAINAGE OF PERITONEAL CAVITY, PERCUTANEOUS APPROACH: ICD-10-PCS | Performed by: RADIOLOGY

## 2022-10-04 PROCEDURE — 99232 SBSQ HOSP IP/OBS MODERATE 35: CPT | Performed by: STUDENT IN AN ORGANIZED HEALTH CARE EDUCATION/TRAINING PROGRAM

## 2022-10-04 PROCEDURE — 700111 HCHG RX REV CODE 636 W/ 250 OVERRIDE (IP): Performed by: HOSPITALIST

## 2022-10-04 PROCEDURE — 700102 HCHG RX REV CODE 250 W/ 637 OVERRIDE(OP): Performed by: HOSPITALIST

## 2022-10-04 PROCEDURE — P9047 ALBUMIN (HUMAN), 25%, 50ML: HCPCS | Performed by: STUDENT IN AN ORGANIZED HEALTH CARE EDUCATION/TRAINING PROGRAM

## 2022-10-04 PROCEDURE — 89051 BODY FLUID CELL COUNT: CPT

## 2022-10-04 RX ORDER — ALBUMIN (HUMAN) 12.5 G/50ML
62.5 SOLUTION INTRAVENOUS ONCE
Status: COMPLETED | OUTPATIENT
Start: 2022-10-04 | End: 2022-10-04

## 2022-10-04 RX ADMIN — MIDODRINE HYDROCHLORIDE 5 MG: 5 TABLET ORAL at 06:01

## 2022-10-04 RX ADMIN — RIFAXIMIN 550 MG: 550 TABLET ORAL at 19:50

## 2022-10-04 RX ADMIN — FUROSEMIDE 40 MG: 10 INJECTION, SOLUTION INTRAMUSCULAR; INTRAVENOUS at 06:00

## 2022-10-04 RX ADMIN — OXYCODONE 5 MG: 5 TABLET ORAL at 03:34

## 2022-10-04 RX ADMIN — SENNOSIDES AND DOCUSATE SODIUM 2 TABLET: 50; 8.6 TABLET ORAL at 06:00

## 2022-10-04 RX ADMIN — ONDANSETRON 4 MG: 2 INJECTION INTRAMUSCULAR; INTRAVENOUS at 03:34

## 2022-10-04 RX ADMIN — SENNOSIDES AND DOCUSATE SODIUM 2 TABLET: 50; 8.6 TABLET ORAL at 17:41

## 2022-10-04 RX ADMIN — ALBUMIN (HUMAN) 62.5 G: 5 SOLUTION INTRAVENOUS at 10:58

## 2022-10-04 RX ADMIN — MIDODRINE HYDROCHLORIDE 5 MG: 5 TABLET ORAL at 17:41

## 2022-10-04 RX ADMIN — RIFAXIMIN 550 MG: 550 TABLET ORAL at 06:00

## 2022-10-04 RX ADMIN — OXYCODONE 5 MG: 5 TABLET ORAL at 21:53

## 2022-10-04 RX ADMIN — OXYCODONE 5 MG: 5 TABLET ORAL at 12:35

## 2022-10-04 RX ADMIN — MIDODRINE HYDROCHLORIDE 5 MG: 5 TABLET ORAL at 12:35

## 2022-10-04 RX ADMIN — POLYETHYLENE GLYCOL 3350 1 PACKET: 17 POWDER, FOR SOLUTION ORAL at 17:41

## 2022-10-04 RX ADMIN — POLYETHYLENE GLYCOL 3350 1 PACKET: 17 POWDER, FOR SOLUTION ORAL at 17:42

## 2022-10-04 RX ADMIN — MAGNESIUM HYDROXIDE 30 ML: 400 SUSPENSION ORAL at 12:35

## 2022-10-04 RX ADMIN — OXYCODONE 5 MG: 5 TABLET ORAL at 17:44

## 2022-10-04 ASSESSMENT — ENCOUNTER SYMPTOMS
HEARTBURN: 0
DOUBLE VISION: 0
FEVER: 0
PND: 0
TREMORS: 1
VOMITING: 0
COUGH: 0
ABDOMINAL PAIN: 0
SHORTNESS OF BREATH: 0
NAUSEA: 0
CHILLS: 0
SORE THROAT: 0
PALPITATIONS: 0
NECK PAIN: 0
DIZZINESS: 0
SPUTUM PRODUCTION: 0
ORTHOPNEA: 0
FOCAL WEAKNESS: 0
DEPRESSION: 0
BLURRED VISION: 0
HEADACHES: 0
MYALGIAS: 0

## 2022-10-04 ASSESSMENT — COGNITIVE AND FUNCTIONAL STATUS - GENERAL
DAILY ACTIVITIY SCORE: 24
MOBILITY SCORE: 24
SUGGESTED CMS G CODE MODIFIER MOBILITY: CH
SUGGESTED CMS G CODE MODIFIER DAILY ACTIVITY: CH

## 2022-10-04 ASSESSMENT — PAIN DESCRIPTION - PAIN TYPE
TYPE: ACUTE PAIN
TYPE: CHRONIC PAIN

## 2022-10-04 ASSESSMENT — PATIENT HEALTH QUESTIONNAIRE - PHQ9
2. FEELING DOWN, DEPRESSED, IRRITABLE, OR HOPELESS: NOT AT ALL
2. FEELING DOWN, DEPRESSED, IRRITABLE, OR HOPELESS: NOT AT ALL
1. LITTLE INTEREST OR PLEASURE IN DOING THINGS: NOT AT ALL
SUM OF ALL RESPONSES TO PHQ9 QUESTIONS 1 AND 2: 0
1. LITTLE INTEREST OR PLEASURE IN DOING THINGS: NOT AT ALL
SUM OF ALL RESPONSES TO PHQ9 QUESTIONS 1 AND 2: 0

## 2022-10-04 ASSESSMENT — FIBROSIS 4 INDEX: FIB4 SCORE: 4.54

## 2022-10-04 NOTE — ED NOTES
Report given to NICO Valladares. Pt transported to inpt unit with all belongings. VSS at this time.

## 2022-10-04 NOTE — CARE PLAN
Problem: Knowledge Deficit - Standard  Goal: Patient and family/care givers will demonstrate understanding of plan of care, disease process/condition, diagnostic tests and medications  Outcome: Progressing     Problem: Pain - Standard  Goal: Alleviation of pain or a reduction in pain to the patient’s comfort goal  Outcome: Progressing     The patient is Stable - Low risk of patient condition declining or worsening         Progress made toward(s) clinical / shift goals:  Patient understands the treatment and tests that are being completed and have been set by the care team. Patient understands the importance of voicing feelings and needs. Patient calls for pain control appropriately.     Patient is not progressing towards the following goals:

## 2022-10-04 NOTE — PROGRESS NOTES
4 Eyes Skin Assessment Completed by ADA RN and NICO Lopez.    Head WDL  Ears WDL  Nose WDL  Mouth WDL  Neck WDL  Breast/Chest WDL  Shoulder Blades WDL  Spine WDL  (R) Arm/Elbow/Hand WDL  (L) Arm/Elbow/Hand WDL  Abdomen Redness  Groin WDL  Scrotum/Coccyx/Buttocks WDL  (R) Leg WDL  (L) Leg WDL  (R) Heel/Foot/Toe WDL  (L) Heel/Foot/Toe WDL          Devices In Places Pulse Ox      Interventions In Place N/A    Possible Skin Injury No    Pictures Uploaded Into Epic N/A  Wound Consult Placed N/A  RN Wound Prevention Protocol Ordered No

## 2022-10-04 NOTE — PROGRESS NOTES
Assumed care from Corina WALSH.  Assessment complete. Plan of care gone over with patient and all concerns. Patient was resting in bed comfortably with wife and adult son at bedside. Patient was A&O x 4. Safety precautions in place. Patient had no concerns at this time and educated on how to use the call light.

## 2022-10-05 VITALS
OXYGEN SATURATION: 95 % | HEIGHT: 75 IN | RESPIRATION RATE: 18 BRPM | WEIGHT: 221.78 LBS | HEART RATE: 87 BPM | TEMPERATURE: 97.8 F | DIASTOLIC BLOOD PRESSURE: 67 MMHG | SYSTOLIC BLOOD PRESSURE: 105 MMHG | BODY MASS INDEX: 27.58 KG/M2

## 2022-10-05 PROCEDURE — 700102 HCHG RX REV CODE 250 W/ 637 OVERRIDE(OP): Performed by: HOSPITALIST

## 2022-10-05 PROCEDURE — 99239 HOSP IP/OBS DSCHRG MGMT >30: CPT | Mod: GC | Performed by: INTERNAL MEDICINE

## 2022-10-05 PROCEDURE — A9270 NON-COVERED ITEM OR SERVICE: HCPCS | Performed by: HOSPITALIST

## 2022-10-05 RX ORDER — OXYCODONE HYDROCHLORIDE 5 MG/1
5 TABLET ORAL EVERY 8 HOURS PRN
Qty: 10 TABLET | Refills: 0 | Status: SHIPPED | OUTPATIENT
Start: 2022-10-05 | End: 2022-10-05 | Stop reason: SDUPTHER

## 2022-10-05 RX ORDER — MIDODRINE HYDROCHLORIDE 5 MG/1
5 TABLET ORAL 3 TIMES DAILY
Qty: 60 TABLET | Refills: 0 | Status: SHIPPED | OUTPATIENT
Start: 2022-10-05 | End: 2023-10-11

## 2022-10-05 RX ORDER — OXYCODONE HYDROCHLORIDE 5 MG/1
5 TABLET ORAL EVERY 8 HOURS PRN
Qty: 10 TABLET | Refills: 0 | Status: SHIPPED | OUTPATIENT
Start: 2022-10-05 | End: 2022-10-15

## 2022-10-05 RX ORDER — SPIRONOLACTONE 100 MG/1
100 TABLET, FILM COATED ORAL DAILY
Qty: 30 TABLET | Refills: 0 | Status: SHIPPED | OUTPATIENT
Start: 2022-10-05

## 2022-10-05 RX ORDER — FUROSEMIDE 40 MG/1
40 TABLET ORAL DAILY
Qty: 30 TABLET | Refills: 0 | Status: SHIPPED | OUTPATIENT
Start: 2022-10-05 | End: 2022-10-05 | Stop reason: SDUPTHER

## 2022-10-05 RX ORDER — FUROSEMIDE 40 MG/1
40 TABLET ORAL DAILY
Qty: 30 TABLET | Refills: 0 | Status: SHIPPED | OUTPATIENT
Start: 2022-10-05

## 2022-10-05 RX ADMIN — RIFAXIMIN 550 MG: 550 TABLET ORAL at 05:48

## 2022-10-05 RX ADMIN — MIDODRINE HYDROCHLORIDE 5 MG: 5 TABLET ORAL at 12:40

## 2022-10-05 RX ADMIN — SENNOSIDES AND DOCUSATE SODIUM 2 TABLET: 50; 8.6 TABLET ORAL at 05:48

## 2022-10-05 RX ADMIN — SPIRONOLACTONE 100 MG: 25 TABLET ORAL at 05:48

## 2022-10-05 RX ADMIN — OXYCODONE 2.5 MG: 5 TABLET ORAL at 05:47

## 2022-10-05 RX ADMIN — MIDODRINE HYDROCHLORIDE 5 MG: 5 TABLET ORAL at 05:48

## 2022-10-05 NOTE — PROGRESS NOTES
Patient is sleeping and appears comfortable with no complaints at this time. Bedside table and call light are within reach.

## 2022-10-05 NOTE — PROGRESS NOTES
Pt discharged in stable condition. PIV removed and provided the patient and family present with discharge instructions, medication updates, and follow up care information. At this time patient and wife verbalized concerns regarding no pain management medication. This RN spoke to Dr. Contreras regarding patient's expressed concerns and per the MD's request, obtained further info on pain concerns from patient for presentation. After presentation of information, MD placed STAT referral to pain management for outpatient follow up and prescribed temporary medication to patient's preferred pharmacy. Patient and wife thanked this RN for assistance and discharged.

## 2022-10-05 NOTE — DISCHARGE INSTRUCTIONS
-Establish care with primary care provider at [place] [facility], Appointment October 27th. Call [phone] if you can get earlier appointment  -Referral placed to GI. Our schedular will try to get an appointment with GI scheduled, if not please see primary care provider for referral. You need to be followed by GI.   -See primary care for referral nutrition and management of chronic medical conditions and medications. Can discuss using lactulose for bowel.   -Take medications as directed. Take old medications to pharmacy for disposal.  -You can use miralax for constipation. Buy it over the counter.

## 2022-10-05 NOTE — DOCUMENTATION QUERY
[facility]                                                                       Query Response Note      PATIENT:               ELVIRA SETH  ACCT #:                  [id number]  MRN:                     [id number]  :                      1970  ADMIT DATE:       10/3/2022 1:21 PM  DISCH DATE:          RESPONDING  PROVIDER #:        [id number]           QUERY TEXT:    Based on the diagnostic and clinical findings, can a diagnosis be made?    The patient's clinical indicators include:  Findings:  --Per ER provider notes, ''acute pancreatitis--mild elevation in lipase at this time and pending ultrasound results from outside hospital'' stated  --Lipase on 10/03:  181  --Patient presented with worsening abdominal pain, abdominal swelling and increased fatigue  Treatment:  --Oxycodone tablet 5 mg po every 4 hours prn moderate pain  --Zofran IV 4mg every 4 hours prn N/V  --US pending results from outside hospital  --Labs  Risk Factors:  --Decompensated liver cirrhosis second to ETOH abuse, Hepatic encephalopathy, ascites d/t ETOH cirrhosis    Thank you,  Cindy Lin RN, BSN  Clinical   Connect via [employer]  Options provided:   -- Acute pancreatitis ruled in   -- Acute pancreatitis ruled out   -- Other explanation, Please specify   -- Unable to determine      Query created by: Cindy Lin on 10/5/2022 5:33 AM    RESPONSE TEXT:    Acute pancreatitis ruled out          Electronically signed by:  EMILEE VALENCIA MD 10/5/2022 7:05 AM

## 2022-10-05 NOTE — CARE PLAN
The patient is Stable - Low risk of patient condition declining or worsening    Shift Goals  Clinical Goals: overnight obs; intent to DC order in  Patient Goals: pain mgmt, DC  Family Goals: DC    Progress made toward(s) clinical / shift goals:    Problem: Knowledge Deficit - Standard  Goal: Patient and family/care givers will demonstrate understanding of plan of care, disease process/condition, diagnostic tests and medications  Outcome: Progressing     Problem: Pain - Standard  Goal: Alleviation of pain or a reduction in pain to the patient’s comfort goal  Outcome: Progressing

## 2022-10-05 NOTE — DISCHARGE SUMMARY
"[facility] Discharge Summary    Attending: Dr. Zulma Felix  Senior Resident: Dr. Contreras  Intern:  Dr. Connors  Contact Number: [phone]    CHIEF COMPLAINT ON ADMISSION  Chief Complaint   Patient presents with    Abdominal Pain     \"I have a liver problem they said when I was in [place]. Got back last night.\" Pt has abdominal pain and swelling, testicular pain. Pt diagnosed with decompensated cirrhosis from [facility].       Reason for Admission  Ascites due to alcoholic cirrhosis, hepatic encephalopathy     Admission Date  10/3/2022    CODE STATUS  Full Code    HPI & HOSPITAL COURSE  Rodolfo Billings is a 51-year-old male with a past medical history of alcohol abuse and hepatic cirrhosis who presented to the ED 10/3 for complaints of worsening abdominal pain and swelling along with testicular pain, increased fatigue and bilateral leg swelling.  Patient was recently seen at the [facility] and diagnosed with decompensated cirrhosis.  Patient states he currently does not drink, but drinks heavily in his 20s.    In the ED, vitals were within normal limits.  Ultrasound-guided paracentesis was performed and 8.8 L of straw-colored fluid were taken out.     On admission, patient treated with IV Lasix and Aldactone 100 mg as well as rifaximin, lactulose and and midodrine 5 mg for hypotension.  Patient states he felt significantly better after the paracentesis as well as when he was given rifaximin.  Patient planning to establish care with a primary care provider and GI doctor on discharge.    Therefore, he is discharged in good and stable condition to home with close outpatient follow-up.    The patient recovered much more quickly than anticipated on admission.    Discharge Date  10/5/2022    Physical Exam on Day of Discharge  Physical Exam  Constitutional:       General: He is not in acute distress.  HENT:      Head: Normocephalic and atraumatic.      Mouth/Throat:      Mouth: Mucous " membranes are moist.   Eyes:      Conjunctiva/sclera: Conjunctivae normal.   Cardiovascular:      Rate and Rhythm: Normal rate and regular rhythm.      Pulses: Normal pulses.      Heart sounds: Normal heart sounds.   Pulmonary:      Effort: Pulmonary effort is normal.      Breath sounds: Normal breath sounds.   Abdominal:      General: Bowel sounds are normal.      Palpations: Abdomen is soft.      Tenderness: There is no abdominal tenderness. There is no guarding.   Musculoskeletal:         General: Normal range of motion.      Right lower leg: No edema.      Left lower leg: No edema.   Skin:     General: Skin is warm and dry.      Coloration: Skin is not jaundiced.      Findings: No rash.   Neurological:      General: No focal deficit present.      Mental Status: He is alert and oriented to person, place, and time.      Sensory: No sensory deficit.      Motor: No weakness.      Coordination: Coordination normal.      Gait: Gait normal.      Deep Tendon Reflexes: Reflexes normal.   Psychiatric:         Mood and Affect: Mood normal.         Behavior: Behavior normal.       FOLLOW UP ITEMS POST DISCHARGE  -Establish care with primary care provider at [facility] medicine clinic, Appointment October 27th. Call [phone] if you can get earlier appointment  -Referral placed to GI. Our schedular will try to get an appointment with GI scheduled, if not please see primary care provider for referral. You need to be followed by GI.   -See primary care for referral nutrition and management of chronic medical conditions and medications. Can discuss using lactulose for bowel.   -Stat referral placed for pain clinic.  -Take medications as directed. Take old medications to pharmacy for disposal.  -You can use miralax for constipation. Buy it over the counter.    DISCHARGE DIAGNOSES  Principal Problem:    Decompensated liver disease (HCC) POA: Yes  Active Problems:    Ascites due to alcoholic cirrhosis (HCC) POA: Unknown    Hepatic  encephalopathy POA: Unknown    Liver cirrhosis (HCC) POA: Yes  Resolved Problems:    * No resolved hospital problems. *      FOLLOW UP  Future Appointments   Date Time Provider Department Center   10/27/2022  9:00 AM [facility]     No follow-up provider specified.    MEDICATIONS ON DISCHARGE     Medication List        START taking these medications        Instructions   oxyCODONE immediate-release 5 MG Tabs  Commonly known as: ROXICODONE   Take 1 Tablet by mouth every 8 hours as needed for Severe Pain for up to 10 days.  Dose: 5 mg     riFAXIMin 550 MG Tabs tablet  Commonly known as: XIFAXAN   Take 1 Tablet by mouth 2 times a day.  Dose: 550 mg            CONTINUE taking these medications        Instructions   furosemide 40 MG Tabs  Commonly known as: LASIX   Take 1 Tablet by mouth every day.  Dose: 40 mg     midodrine 5 MG Tabs  Commonly known as: PROAMATINE   Take 1 Tablet by mouth 3 times a day.  Dose: 5 mg     spironolactone 100 MG Tabs  Commonly known as: ALDACTONE   Take 1 Tablet by mouth every day.  Dose: 100 mg            STOP taking these medications      ciprofloxacin 500 MG Tabs  Commonly known as: CIPRO              Allergies  No Known Allergies    DIET  Orders Placed This Encounter   Procedures    Diet Order Diet: 2 Gram Sodium     Standing Status:   Standing     Number of Occurrences:   1     Order Specific Question:   Diet:     Answer:   2 Gram Sodium [7]       ACTIVITY  As tolerated.  Weight bearing as tolerated    CONSULTATIONS  None    PROCEDURES  10/4: Ultrasound-guided paracentesis    LABORATORY  Lab Results   Component Value Date    SODIUM 132 (L) 10/04/2022    POTASSIUM 3.8 10/04/2022    CHLORIDE 100 10/04/2022    CO2 21 10/04/2022    GLUCOSE 97 10/04/2022    BUN 12 10/04/2022    CREATININE 0.82 10/04/2022        Lab Results   Component Value Date    WBC 7.5 10/04/2022    HEMOGLOBIN 12.5 (L) 10/04/2022    HEMATOCRIT 36.0 (L) 10/04/2022    PLATELETCT 110 (L)  10/04/2022        Total time of the discharge process exceeds 45 minutes.

## 2022-10-05 NOTE — PROGRESS NOTES
Assumed care of pt, VSS, in NAD, pt oriented to room, call light within reach, bed in lowest position. 2 RN skin assessment completed, skin intact except at paracentesis site, dressing CDI.

## 2022-10-05 NOTE — DIETARY
"Nutrition services: Day 1 of admit.  Rodolfo Billings is a 51 y.o. male admitted with decompensated liver disease, hepatic encephalopathy, ascites  History includes: alcoholic cirrhosis  Patient with weight loss noted on admit screen. Patient reported weight loss was mostly fluid related.  He reports a good appetite and had a lot of questions regarding diet for home.  Reviewed guidelines for limiting sodium to 1911-8054 mg per day.  Patient and wife verbalized understanding and had good questions.    Assessment:  Height: 190.5 cm (6' 3\")  Weight: 101 kg (221 lb 12.5 oz)  Body mass index is 27.72 kg/m².   Diet/Intake: 2 gram Sodium    Evaluation:   No past weight history in [place] [facility]  Pertinent labs (10/4) Sodium 132 (L)  Pertinent Meds:  albumin, Lasix, Zofran    Malnutrition Risk: ASPEN criteria not met.    Recommendations/Inteventions/Plan:  Continue 2 gm Sodium diet   Encourage intake of meals  Document intake of all PO as % taken in ADL's to provide interdisciplinary communication across all shifts.   Monitor weight.  Nutrition rep will continue to see patient for ongoing meal and snack preferences.     RD following.    "

## 2022-10-05 NOTE — PROGRESS NOTES
Pt requested only half of his 5 mg dose of oxycodone. This RN split the dose in half and administered 2.5 mg of oxy per patient request. The other 2.5 mg was wasted with the witness of NICO Powers.

## 2022-10-06 ENCOUNTER — PHARMACY VISIT (OUTPATIENT)
Dept: PHARMACY | Facility: MEDICAL CENTER | Age: 52
End: 2022-10-06
Payer: COMMERCIAL

## 2022-10-06 PROCEDURE — RXMED WILLOW AMBULATORY MEDICATION CHARGE

## 2022-10-06 NOTE — PROGRESS NOTES
Pt and wife called unit after discharge regarding medications sent to pharmacy. pain medication was sent to [facility] instead of [facility] the [facility] did not stock that specific medication. Resources were given to wife to reduce price (was going to be $900) of rifaximin. Pain medication was sent to [facility] and will be picked up by family in morning bc pharmacy was closed at this time.

## 2022-10-07 LAB
BACTERIA FLD AEROBE CULT: NORMAL
GRAM STN SPEC: NORMAL
SIGNIFICANT IND 70042: NORMAL
SITE SITE: NORMAL
SOURCE SOURCE: NORMAL

## 2022-10-21 RX ORDER — MIDODRINE HYDROCHLORIDE 5 MG/1
TABLET ORAL
Qty: 60 TABLET | Refills: 0 | OUTPATIENT
Start: 2022-10-21

## 2022-10-27 ENCOUNTER — HOSPITAL ENCOUNTER (OUTPATIENT)
Dept: RADIOLOGY | Facility: MEDICAL CENTER | Age: 52
End: 2022-10-27
Attending: INTERNAL MEDICINE
Payer: COMMERCIAL

## 2022-10-27 ENCOUNTER — APPOINTMENT (OUTPATIENT)
Dept: RADIOLOGY | Facility: MEDICAL CENTER | Age: 52
End: 2022-10-27
Attending: RADIOLOGY
Payer: COMMERCIAL

## 2022-10-27 ENCOUNTER — HOSPITAL ENCOUNTER (OUTPATIENT)
Facility: MEDICAL CENTER | Age: 52
End: 2022-10-27
Payer: COMMERCIAL

## 2022-10-27 VITALS
WEIGHT: 223.33 LBS | HEART RATE: 83 BPM | RESPIRATION RATE: 18 BRPM | SYSTOLIC BLOOD PRESSURE: 95 MMHG | BODY MASS INDEX: 27.91 KG/M2 | TEMPERATURE: 97.5 F | DIASTOLIC BLOOD PRESSURE: 60 MMHG | OXYGEN SATURATION: 98 %

## 2022-10-27 VITALS — OXYGEN SATURATION: 99 % | SYSTOLIC BLOOD PRESSURE: 113 MMHG | DIASTOLIC BLOOD PRESSURE: 63 MMHG

## 2022-10-27 DIAGNOSIS — R18.8 OTHER ASCITES: ICD-10-CM

## 2022-10-27 DIAGNOSIS — K70.31 ASCITES DUE TO ALCOHOLIC CIRRHOSIS (HCC): ICD-10-CM

## 2022-10-27 PROCEDURE — 700111 HCHG RX REV CODE 636 W/ 250 OVERRIDE (IP): Performed by: RADIOLOGY

## 2022-10-27 PROCEDURE — 160025 RECOVERY II MINUTES (STATS)

## 2022-10-27 PROCEDURE — C1729 CATH, DRAINAGE: HCPCS

## 2022-10-27 PROCEDURE — P9047 ALBUMIN (HUMAN), 25%, 50ML: HCPCS | Performed by: RADIOLOGY

## 2022-10-27 RX ORDER — ALBUMIN (HUMAN) 12.5 G/50ML
62.5 SOLUTION INTRAVENOUS ONCE
Status: CANCELLED | OUTPATIENT
Start: 2022-10-27 | End: 2022-10-27

## 2022-10-27 RX ORDER — ACETAMINOPHEN 500 MG
1000 TABLET ORAL EVERY 6 HOURS
Status: DISCONTINUED | OUTPATIENT
Start: 2022-10-27 | End: 2022-10-27 | Stop reason: HOSPADM

## 2022-10-27 RX ORDER — ALBUMIN (HUMAN) 12.5 G/50ML
62.5 SOLUTION INTRAVENOUS ONCE
Status: COMPLETED | OUTPATIENT
Start: 2022-10-27 | End: 2022-10-27

## 2022-10-27 RX ADMIN — ALBUMIN (HUMAN) 62.5 G: 5 SOLUTION INTRAVENOUS at 15:48

## 2022-10-27 ASSESSMENT — FIBROSIS 4 INDEX: FIB4 SCORE: 4.54

## 2022-10-27 NOTE — DISCHARGE INSTRUCTIONS
If any questions arise, call your provider.  If your provider is not available, please feel free to call the Surgical Center at [phone].

## 2022-10-27 NOTE — OR NURSING
1635 - Received report from Bernie WALSH. Patient is in no signs of distress, VSS, eating crackers and drinking water with no problems.    1719 - Discharge instructions discussed with patient and . All questions answered. Verbalized understanding.    1740 - Albumin infusions complete. VSS. Pt escorted out of department in wheelchair with all belongings. Discharged home to responsible adult. PIV removed with tip intact.

## 2022-10-27 NOTE — OR NURSING
1441 Patient arrived to PACU from ultrasound. Patient pending orders for albumin infusion.     1548 Albumin started. Patient provided with snacks and sprite.

## 2022-10-31 RX ORDER — SPIRONOLACTONE 100 MG/1
TABLET, FILM COATED ORAL
Qty: 30 TABLET | Refills: 0 | OUTPATIENT
Start: 2022-10-31

## 2022-10-31 RX ORDER — FUROSEMIDE 40 MG/1
TABLET ORAL
Qty: 30 TABLET | Refills: 0 | OUTPATIENT
Start: 2022-10-31

## 2022-11-01 ENCOUNTER — HOSPITAL ENCOUNTER (OUTPATIENT)
Dept: RADIOLOGY | Facility: MEDICAL CENTER | Age: 52
End: 2022-11-01
Attending: INTERNAL MEDICINE
Payer: COMMERCIAL

## 2022-11-03 ENCOUNTER — HOSPITAL ENCOUNTER (OUTPATIENT)
Dept: RADIOLOGY | Facility: MEDICAL CENTER | Age: 52
End: 2022-11-03
Attending: INTERNAL MEDICINE
Payer: COMMERCIAL

## 2022-11-03 DIAGNOSIS — R18.8 OTHER ASCITES: ICD-10-CM

## 2022-11-03 DIAGNOSIS — G47.00 INSOMNIA, UNSPECIFIED TYPE: ICD-10-CM

## 2022-11-03 DIAGNOSIS — K74.60 HEPATIC CIRRHOSIS, UNSPECIFIED HEPATIC CIRRHOSIS TYPE, UNSPECIFIED WHETHER ASCITES PRESENT (HCC): ICD-10-CM

## 2022-11-03 DIAGNOSIS — R10.84 ABDOMINAL PAIN, GENERALIZED: ICD-10-CM

## 2022-11-03 DIAGNOSIS — D69.6 THROMBOCYTOPENIA, UNSPECIFIED (HCC): ICD-10-CM

## 2022-11-03 PROCEDURE — 93975 VASCULAR STUDY: CPT

## 2022-11-04 ENCOUNTER — TELEPHONE (OUTPATIENT)
Dept: SCHEDULING | Facility: IMAGING CENTER | Age: 52
End: 2022-11-04

## 2022-11-04 ENCOUNTER — HOSPITAL ENCOUNTER (OUTPATIENT)
Dept: LAB | Facility: MEDICAL CENTER | Age: 52
End: 2022-11-04
Attending: INTERNAL MEDICINE
Payer: COMMERCIAL

## 2022-11-04 LAB
ALBUMIN SERPL BCP-MCNC: 3 G/DL (ref 3.2–4.9)
ALBUMIN/GLOB SERPL: 0.7 G/DL
ALP SERPL-CCNC: 63 U/L (ref 30–99)
ALT SERPL-CCNC: 9 U/L (ref 2–50)
AMMONIA PLAS-SCNC: 48 UMOL/L (ref 11–45)
ANION GAP SERPL CALC-SCNC: 8 MMOL/L (ref 7–16)
APTT PPP: 42.8 SEC (ref 24.7–36)
AST SERPL-CCNC: 35 U/L (ref 12–45)
BASOPHILS # BLD AUTO: 1.3 % (ref 0–1.8)
BASOPHILS # BLD: 0.09 K/UL (ref 0–0.12)
BILIRUB SERPL-MCNC: 2.4 MG/DL (ref 0.1–1.5)
BUN SERPL-MCNC: 14 MG/DL (ref 8–22)
CALCIUM SERPL-MCNC: 9.1 MG/DL (ref 8.5–10.5)
CHLORIDE SERPL-SCNC: 99 MMOL/L (ref 96–112)
CO2 SERPL-SCNC: 24 MMOL/L (ref 20–33)
CREAT SERPL-MCNC: 0.93 MG/DL (ref 0.5–1.4)
EOSINOPHIL # BLD AUTO: 0.14 K/UL (ref 0–0.51)
EOSINOPHIL NFR BLD: 2 % (ref 0–6.9)
ERYTHROCYTE [DISTWIDTH] IN BLOOD BY AUTOMATED COUNT: 53.7 FL (ref 35.9–50)
FERRITIN SERPL-MCNC: 902 NG/ML (ref 22–322)
GFR SERPLBLD CREATININE-BSD FMLA CKD-EPI: 99 ML/MIN/1.73 M 2
GLOBULIN SER CALC-MCNC: 4.2 G/DL (ref 1.9–3.5)
GLUCOSE SERPL-MCNC: 75 MG/DL (ref 65–99)
HBV SURFACE AG SER QL: NORMAL
HCT VFR BLD AUTO: 40.9 % (ref 42–52)
HCV AB SER QL: NORMAL
HGB BLD-MCNC: 14 G/DL (ref 14–18)
IMM GRANULOCYTES # BLD AUTO: 0.03 K/UL (ref 0–0.11)
IMM GRANULOCYTES NFR BLD AUTO: 0.4 % (ref 0–0.9)
INR PPP: 1.55 (ref 0.87–1.13)
IRON SATN MFR SERPL: 83 % (ref 15–55)
IRON SERPL-MCNC: 132 UG/DL (ref 50–180)
LYMPHOCYTES # BLD AUTO: 1.97 K/UL (ref 1–4.8)
LYMPHOCYTES NFR BLD: 28.8 % (ref 22–41)
MCH RBC QN AUTO: 36.9 PG (ref 27–33)
MCHC RBC AUTO-ENTMCNC: 34.2 G/DL (ref 33.7–35.3)
MCV RBC AUTO: 107.9 FL (ref 81.4–97.8)
MONOCYTES # BLD AUTO: 0.68 K/UL (ref 0–0.85)
MONOCYTES NFR BLD AUTO: 9.9 % (ref 0–13.4)
NEUTROPHILS # BLD AUTO: 3.94 K/UL (ref 1.82–7.42)
NEUTROPHILS NFR BLD: 57.6 % (ref 44–72)
NRBC # BLD AUTO: 0 K/UL
NRBC BLD-RTO: 0 /100 WBC
PLATELET # BLD AUTO: 160 K/UL (ref 164–446)
PMV BLD AUTO: 8.6 FL (ref 9–12.9)
POTASSIUM SERPL-SCNC: 4.1 MMOL/L (ref 3.6–5.5)
PROT SERPL-MCNC: 7.2 G/DL (ref 6–8.2)
PROTHROMBIN TIME: 18.3 SEC (ref 12–14.6)
RBC # BLD AUTO: 3.79 M/UL (ref 4.7–6.1)
SODIUM SERPL-SCNC: 131 MMOL/L (ref 135–145)
TIBC SERPL-MCNC: 159 UG/DL (ref 250–450)
UIBC SERPL-MCNC: 27 UG/DL (ref 110–370)
WBC # BLD AUTO: 6.9 K/UL (ref 4.8–10.8)

## 2022-11-04 PROCEDURE — 86038 ANTINUCLEAR ANTIBODIES: CPT

## 2022-11-04 PROCEDURE — 80053 COMPREHEN METABOLIC PANEL: CPT

## 2022-11-04 PROCEDURE — 86015 ACTIN ANTIBODY EACH: CPT

## 2022-11-04 PROCEDURE — 82728 ASSAY OF FERRITIN: CPT

## 2022-11-04 PROCEDURE — 86256 FLUORESCENT ANTIBODY TITER: CPT

## 2022-11-04 PROCEDURE — 82140 ASSAY OF AMMONIA: CPT

## 2022-11-04 PROCEDURE — 82390 ASSAY OF CERULOPLASMIN: CPT

## 2022-11-04 PROCEDURE — 83550 IRON BINDING TEST: CPT

## 2022-11-04 PROCEDURE — 85610 PROTHROMBIN TIME: CPT

## 2022-11-04 PROCEDURE — 85025 COMPLETE CBC W/AUTO DIFF WBC: CPT

## 2022-11-04 PROCEDURE — 82105 ALPHA-FETOPROTEIN SERUM: CPT

## 2022-11-04 PROCEDURE — 87340 HEPATITIS B SURFACE AG IA: CPT

## 2022-11-04 PROCEDURE — 83540 ASSAY OF IRON: CPT

## 2022-11-04 PROCEDURE — 86803 HEPATITIS C AB TEST: CPT

## 2022-11-04 PROCEDURE — 83951 ONCOPROTEIN DCP: CPT

## 2022-11-04 PROCEDURE — 85730 THROMBOPLASTIN TIME PARTIAL: CPT

## 2022-11-04 PROCEDURE — 36415 COLL VENOUS BLD VENIPUNCTURE: CPT

## 2022-11-07 LAB
ACARBOXYPROTHROMBIN SERPL-MCNC: 13.8 NG/ML (ref 0–7.4)
AFP-TM SERPL-MCNC: 6 NG/ML (ref 0–9)

## 2022-11-08 LAB — CERULOPLASMIN SERPL-MCNC: 16 MG/DL (ref 15–30)

## 2022-11-09 LAB — NUCLEAR IGG SER QL IA: NORMAL

## 2022-11-12 LAB
SMA IGG SER-ACNC: 58 UNITS (ref 0–19)
SMOOTH MUSCLE IGG TITR SER: ABNORMAL {TITER}

## 2022-11-15 SDOH — ECONOMIC STABILITY: HOUSING INSECURITY: IN THE LAST 12 MONTHS, HOW MANY PLACES HAVE YOU LIVED?: 1

## 2022-11-15 SDOH — ECONOMIC STABILITY: TRANSPORTATION INSECURITY
IN THE PAST 12 MONTHS, HAS THE LACK OF TRANSPORTATION KEPT YOU FROM MEDICAL APPOINTMENTS OR FROM GETTING MEDICATIONS?: NO

## 2022-11-15 SDOH — HEALTH STABILITY: PHYSICAL HEALTH: ON AVERAGE, HOW MANY DAYS PER WEEK DO YOU ENGAGE IN MODERATE TO STRENUOUS EXERCISE (LIKE A BRISK WALK)?: 0 DAYS

## 2022-11-15 SDOH — ECONOMIC STABILITY: HOUSING INSECURITY
IN THE LAST 12 MONTHS, WAS THERE A TIME WHEN YOU DID NOT HAVE A STEADY PLACE TO SLEEP OR SLEPT IN A SHELTER (INCLUDING NOW)?: NO

## 2022-11-15 SDOH — HEALTH STABILITY: PHYSICAL HEALTH: ON AVERAGE, HOW MANY MINUTES DO YOU ENGAGE IN EXERCISE AT THIS LEVEL?: 0 MIN

## 2022-11-15 SDOH — ECONOMIC STABILITY: INCOME INSECURITY: HOW HARD IS IT FOR YOU TO PAY FOR THE VERY BASICS LIKE FOOD, HOUSING, MEDICAL CARE, AND HEATING?: NOT HARD AT ALL

## 2022-11-15 SDOH — ECONOMIC STABILITY: INCOME INSECURITY: IN THE LAST 12 MONTHS, WAS THERE A TIME WHEN YOU WERE NOT ABLE TO PAY THE MORTGAGE OR RENT ON TIME?: NO

## 2022-11-15 SDOH — ECONOMIC STABILITY: FOOD INSECURITY: WITHIN THE PAST 12 MONTHS, YOU WORRIED THAT YOUR FOOD WOULD RUN OUT BEFORE YOU GOT MONEY TO BUY MORE.: NEVER TRUE

## 2022-11-15 SDOH — HEALTH STABILITY: MENTAL HEALTH
STRESS IS WHEN SOMEONE FEELS TENSE, NERVOUS, ANXIOUS, OR CAN'T SLEEP AT NIGHT BECAUSE THEIR MIND IS TROUBLED. HOW STRESSED ARE YOU?: VERY MUCH

## 2022-11-15 SDOH — ECONOMIC STABILITY: TRANSPORTATION INSECURITY
IN THE PAST 12 MONTHS, HAS LACK OF RELIABLE TRANSPORTATION KEPT YOU FROM MEDICAL APPOINTMENTS, MEETINGS, WORK OR FROM GETTING THINGS NEEDED FOR DAILY LIVING?: NO

## 2022-11-15 SDOH — ECONOMIC STABILITY: TRANSPORTATION INSECURITY
IN THE PAST 12 MONTHS, HAS LACK OF TRANSPORTATION KEPT YOU FROM MEETINGS, WORK, OR FROM GETTING THINGS NEEDED FOR DAILY LIVING?: NO

## 2022-11-15 SDOH — ECONOMIC STABILITY: FOOD INSECURITY: WITHIN THE PAST 12 MONTHS, THE FOOD YOU BOUGHT JUST DIDN'T LAST AND YOU DIDN'T HAVE MONEY TO GET MORE.: NEVER TRUE

## 2022-11-15 ASSESSMENT — SOCIAL DETERMINANTS OF HEALTH (SDOH)
HOW OFTEN DO YOU HAVE SIX OR MORE DRINKS ON ONE OCCASION: NEVER
HOW OFTEN DO YOU ATTEND CHURCH OR RELIGIOUS SERVICES?: NEVER
IN A TYPICAL WEEK, HOW MANY TIMES DO YOU TALK ON THE PHONE WITH FAMILY, FRIENDS, OR NEIGHBORS?: ONCE A WEEK
HOW OFTEN DO YOU ATTENT MEETINGS OF THE CLUB OR ORGANIZATION YOU BELONG TO?: NEVER
HOW MANY DRINKS CONTAINING ALCOHOL DO YOU HAVE ON A TYPICAL DAY WHEN YOU ARE DRINKING: PATIENT DOES NOT DRINK
HOW OFTEN DO YOU GET TOGETHER WITH FRIENDS OR RELATIVES?: ONCE A WEEK
DO YOU BELONG TO ANY CLUBS OR ORGANIZATIONS SUCH AS CHURCH GROUPS UNIONS, FRATERNAL OR ATHLETIC GROUPS, OR SCHOOL GROUPS?: NO
WITHIN THE PAST 12 MONTHS, YOU WORRIED THAT YOUR FOOD WOULD RUN OUT BEFORE YOU GOT THE MONEY TO BUY MORE: NEVER TRUE
HOW OFTEN DO YOU ATTEND CHURCH OR RELIGIOUS SERVICES?: NEVER
HOW OFTEN DO YOU GET TOGETHER WITH FRIENDS OR RELATIVES?: ONCE A WEEK
IN A TYPICAL WEEK, HOW MANY TIMES DO YOU TALK ON THE PHONE WITH FAMILY, FRIENDS, OR NEIGHBORS?: ONCE A WEEK
HOW OFTEN DO YOU ATTENT MEETINGS OF THE CLUB OR ORGANIZATION YOU BELONG TO?: NEVER
HOW HARD IS IT FOR YOU TO PAY FOR THE VERY BASICS LIKE FOOD, HOUSING, MEDICAL CARE, AND HEATING?: NOT HARD AT ALL
DO YOU BELONG TO ANY CLUBS OR ORGANIZATIONS SUCH AS CHURCH GROUPS UNIONS, FRATERNAL OR ATHLETIC GROUPS, OR SCHOOL GROUPS?: NO
HOW OFTEN DO YOU HAVE A DRINK CONTAINING ALCOHOL: NEVER

## 2022-11-15 ASSESSMENT — LIFESTYLE VARIABLES
AUDIT-C TOTAL SCORE: 0
HOW MANY STANDARD DRINKS CONTAINING ALCOHOL DO YOU HAVE ON A TYPICAL DAY: PATIENT DOES NOT DRINK
HOW OFTEN DO YOU HAVE A DRINK CONTAINING ALCOHOL: NEVER
SKIP TO QUESTIONS 9-10: 1
HOW OFTEN DO YOU HAVE SIX OR MORE DRINKS ON ONE OCCASION: NEVER

## 2022-11-17 ENCOUNTER — TELEPHONE (OUTPATIENT)
Dept: MEDICAL GROUP | Facility: PHYSICIAN GROUP | Age: 52
End: 2022-11-17

## 2022-11-17 ENCOUNTER — OFFICE VISIT (OUTPATIENT)
Dept: MEDICAL GROUP | Facility: PHYSICIAN GROUP | Age: 52
End: 2022-11-17
Payer: COMMERCIAL

## 2022-11-17 VITALS
BODY MASS INDEX: 27.77 KG/M2 | WEIGHT: 240 LBS | DIASTOLIC BLOOD PRESSURE: 70 MMHG | HEART RATE: 88 BPM | HEIGHT: 78 IN | OXYGEN SATURATION: 97 % | TEMPERATURE: 98.6 F | SYSTOLIC BLOOD PRESSURE: 102 MMHG

## 2022-11-17 DIAGNOSIS — K74.69 DECOMPENSATED LIVER DISEASE (HCC): ICD-10-CM

## 2022-11-17 DIAGNOSIS — K70.31 ALCOHOLIC CIRRHOSIS OF LIVER WITH ASCITES (HCC): ICD-10-CM

## 2022-11-17 DIAGNOSIS — K76.82 HEPATIC ENCEPHALOPATHY (HCC): ICD-10-CM

## 2022-11-17 DIAGNOSIS — Z76.89 ESTABLISHING CARE WITH NEW DOCTOR, ENCOUNTER FOR: ICD-10-CM

## 2022-11-17 DIAGNOSIS — R06.02 SHORTNESS OF BREATH: ICD-10-CM

## 2022-11-17 DIAGNOSIS — K70.31 ASCITES DUE TO ALCOHOLIC CIRRHOSIS (HCC): ICD-10-CM

## 2022-11-17 PROCEDURE — 99214 OFFICE O/P EST MOD 30 MIN: CPT | Performed by: NURSE PRACTITIONER

## 2022-11-17 RX ORDER — ALBUTEROL SULFATE 90 UG/1
1-2 AEROSOL, METERED RESPIRATORY (INHALATION) EVERY 6 HOURS PRN
Qty: 18 G | Refills: 11 | Status: SHIPPED | OUTPATIENT
Start: 2022-11-17

## 2022-11-17 RX ORDER — TRAZODONE HYDROCHLORIDE 100 MG/1
TABLET ORAL
COMMUNITY
Start: 2022-11-15

## 2022-11-17 RX ORDER — ALBUTEROL SULFATE 90 UG/1
AEROSOL, METERED RESPIRATORY (INHALATION)
COMMUNITY
Start: 2022-09-24 | End: 2022-11-17 | Stop reason: SDUPTHER

## 2022-11-17 RX ORDER — ACETAMINOPHEN 500 MG
500 TABLET ORAL EVERY 6 HOURS PRN
COMMUNITY

## 2022-11-17 ASSESSMENT — FIBROSIS 4 INDEX: FIB4 SCORE: 3.72

## 2022-11-17 NOTE — ASSESSMENT & PLAN NOTE
New to examiner.  Patient was seen in the emergency room on 10/3/2022 with history of alcohol abuse and hepatic cirrhosis that was diagnosed in September 2022 at a hospital in [place].  He was seen in the emergency room for complaints of worsening abdominal pain and swelling along with testicular pain, fatigue, bilateral lower extremity edema.  Patient states he has not had any alcohol since July 2022, but was previously drinking a sixpack of beer daily for 20 years.  While in [place], he had paracentesis x2 with a total of 25 L removed, and the emergency room on 10/3/2022 he had 8.8 L of straw-colored fluid removed via paracentesis.  The patient was treated with IV furosemide and Aldactone as well as rifaximin, lactulose, and midodrine 5 mg for hypotension.  He was feeling better after paracentesis and rifaximin, discharged to establish with PCP and GI doctor.  [facility] Dr[facility] Lewis, [facility].  He has completed an abdominal ultrasound as well as an additional paracentesis removing approximately 10 L.  He is scheduled for his fifth paracentesis on 11/18/2022 and has recurring appointments every 2 weeks scheduled.  Liver and vessel ultrasound completed on 11/3/2022 showed moderate to large amount of ascites, liver surface appears nodular consistent with cirrhosis, portal veins and hepatic veins are patent and antegrade.  Patient reports that after paracentesis he has a good 6 hours then severe pain and anxiety return.  He was given oxycodone in the hospital for pain, is taking over-the-counter acetaminophen outpatient for pain.  Notes drinking strawberry milk helps with pain.

## 2022-11-17 NOTE — PROGRESS NOTES
1. Ascites due to alcoholic cirrhosis (HCC)  - Referral to Transplant Care    2. Decompensated liver disease (HCC)  - Referral to Transplant Care    3. Hepatic encephalopathy  - Referral to Transplant Care    4. Alcoholic cirrhosis of liver with ascites (HCC)  - Referral to Transplant Care    Patient's wife is requesting referral to [facility] for possible liver transplant.  Referral placed and faxed to Jacque at [phone].

## 2022-11-17 NOTE — TELEPHONE ENCOUNTER
Pt's wife Lesley called and requested that a referral be sent to [facility] for possible liver transplant. She would like this to be addressed to Jacque and faxed to [phone].

## 2022-11-17 NOTE — ASSESSMENT & PLAN NOTE
New to examiner.  Patient was seen in the emergency room on 10/3/2022 with history of alcohol abuse and hepatic cirrhosis that was diagnosed in September 2022 at a hospital in [place].  He was seen in the emergency room for complaints of worsening abdominal pain and swelling along with testicular pain, fatigue, bilateral lower extremity edema.  Patient states he has not had any alcohol since July 2022, but was previously drinking a sixpack of beer daily for 20 years.  While in [place], he had paracentesis x2 with a total of 25 L removed, and the emergency room on 10/3/2022 he had 8.8 L of straw-colored fluid removed via paracentesis.  The patient was treated with IV furosemide and Aldactone as well as rifaximin, lactulose, and midodrine 5 mg for hypotension.  He was feeling better after paracentesis and rifaximin, discharged to establish with PCP and GI doctor.  [facility] Dr[facility] Lewis, once.  He has completed an abdominal ultrasound as well as an additional paracentesis removing approximately 10 L.  He is scheduled for his fifth paracentesis on 11/18/2022 and has recurring appointments every 2 weeks scheduled.  Liver and vessel ultrasound completed on 11/3/2022 showed moderate to large amount of ascites, liver surface appears nodular consistent with cirrhosis, portal veins and hepatic veins are patent and antegrade.  Patient reports that after paracentesis he has a good 6 hours then severe pain and anxiety return.  He was given oxycodone in the hospital for pain, is taking over-the-counter acetaminophen outpatient for pain.  Notes drinking strawberry milk helps with pain.

## 2022-11-17 NOTE — ASSESSMENT & PLAN NOTE
[facility]    Last couple months  ER in [facility] clinic - high bp for dental work  Stress and depression  Wife had to move to [place] for a year  Not drinking more than before  [place] to [place] tired more than usual, travels a lot for work  Then went to [place], tired, tummy ache  Hotel - laid down   9/19 9/24/22 ICU hosp 6 days   Left, made it to ER [facility]   Referred to GI    After being drained good 6 hours, then severe pain returns - 10-15 minutes then anxiety, stomach ache, bladder pain, pancreas pain, spleen pain    Some days doing well    Was in the [employer] -     Getting medications straight    Oxycodone in the hospital for pain  [place] milk helps    4 times 25 first 2  11-12 l #3 4

## 2022-11-17 NOTE — LETTER
[facility]  Pcp Pt States None  No address on file  Fax: [phone]   Authorization for Release/Disclosure of   Protected Health Information   Name: RODOLFO SETH : 1970 SSN: [id number]   Address: [facility] [place] [address] Phone:    [phone] (home)    I authorize the entity listed below to release/disclose the PHI below to:   [facility]/Pcp Pt States None and JILLIAN Fiore   Provider or Entity Name:  [facility]   Address   City, [facility]   Phone:      Fax:     Reason for request: continuity of care   Information to be released:    [  ] LAST COLONOSCOPY,  including any PATH REPORT and follow-up  [  ] LAST FIT/COLOGUARD RESULT [  ] LAST DEXA  [  ] LAST MAMMOGRAM  [  ] LAST PAP  [  ] LAST LABS [  ] RETINA EXAM REPORT  [  ] IMMUNIZATION RECORDS  [X] Release all info      [  ] Check here and initial the line next to each item to release ALL health information INCLUDING  _____ Care and treatment for drug and / or alcohol abuse  _____ HIV testing, infection status, or AIDS  _____ Genetic Testing    DATES OF SERVICE OR TIME PERIOD TO BE DISCLOSED: _____________  I understand and acknowledge that:  * This Authorization may be revoked at any time by you in writing, except if your health information has already been used or disclosed.  * Your health information that will be used or disclosed as a result of you signing this authorization could be re-disclosed by the recipient. If this occurs, your re-disclosed health information may no longer be protected by State or Federal laws.  * You may refuse to sign this Authorization. Your refusal will not affect your ability to obtain treatment.  * This Authorization becomes effective upon signing and will  on (date) __________.      If no date is indicated, this Authorization will  one (1) year from the signature date.    Name: Rodolfo Seth    Signature:   Date:     2022       PLEASE FAX  REQUESTED RECORDS BACK TO: [phone]

## 2022-11-17 NOTE — ASSESSMENT & PLAN NOTE
New to examiner.  Patient was seen in the emergency room on 10/3/2022 with history of alcohol abuse and hepatic cirrhosis that was diagnosed in September 2022 at a hospital in [place].  He was seen in the emergency room for complaints of worsening abdominal pain and swelling along with testicular pain, fatigue, bilateral lower extremity edema.  Patient states he has not had any alcohol since July 2022, but was previously drinking a sixpack of beer daily for 20 years.  While in [place], he had paracentesis x2 with a total of 25 L removed, and the emergency room on 10/3/2022 he had 8.8 L of straw-colored fluid removed via paracentesis.  The patient was treated with IV furosemide and Aldactone as well as rifaximin, lactulose, and midodrine 5 mg for hypotension.  He was feeling better after paracentesis and rifaximin, discharged to establish with PCP and GI doctor.  [facility], [facility].  He has completed an abdominal ultrasound as well as an additional paracentesis removing approximately 10 L.  He is scheduled for his fifth paracentesis on 11/18/2022 and has recurring appointments every 2 weeks scheduled.  Liver and vessel ultrasound completed on 11/3/2022 showed moderate to large amount of ascites, liver surface appears nodular consistent with cirrhosis, portal veins and hepatic veins are patent and antegrade.  Patient reports that after paracentesis he has a good 6 hours then severe pain and anxiety return.  He was given oxycodone in the hospital for pain, is taking over-the-counter acetaminophen outpatient for pain.  Notes drinking strawberry milk helps with pain.

## 2022-11-17 NOTE — PROGRESS NOTES
CC:   Chief Complaint   Patient presents with    Establish Care     New patient     HISTORY OF THE PRESENT ILLNESS: Patient is a 52 y.o. male. This pleasant patient is here today to establish care and discuss multiple issues as listed below.    Health Maintenance: Reviewed    Ascites due to alcoholic cirrhosis (HCC)  Decompensated liver disease (HCC)  Liver cirrhosis (HCC)  New to examiner.  Patient was seen in the emergency room on 10/3/2022 with history of alcohol abuse and hepatic cirrhosis that was diagnosed in September 2022 at a hospital in [place].  He was seen in the emergency room for complaints of worsening abdominal pain and swelling along with testicular pain, fatigue, bilateral lower extremity edema.  Patient states he has not had any alcohol since July 2022, but was previously drinking a sixpack of beer daily for 20 years.  While in [place], he had paracentesis x2 with a total of 25 L removed, and the emergency room on 10/3/2022 he had 8.8 L of straw-colored fluid removed via paracentesis.  The patient was treated with IV furosemide and Aldactone as well as rifaximin, lactulose, and midodrine 5 mg for hypotension.  He was feeling better after paracentesis and rifaximin, discharged to establish with PCP and GI doctor.  [facility], [facility].  He has completed an abdominal ultrasound as well as an additional paracentesis removing approximately 10 L.  He is scheduled for his fifth paracentesis on 11/18/2022 and has recurring appointments every 2 weeks scheduled.  Liver and vessel ultrasound completed on 11/3/2022 showed moderate to large amount of ascites, liver surface appears nodular consistent with cirrhosis, portal veins and hepatic veins are patent and antegrade.  Patient reports that after paracentesis he has a good 6 hours then severe pain and anxiety return.  He was given oxycodone in the hospital for pain, is taking over-the-counter acetaminophen outpatient for  "pain.  Notes drinking strawberry milk helps with pain.    Hepatic encephalopathy  New to examiner, ongoing for patient.  Patient is taking rifaximin 550 mg twice daily.  Goal BM 2-3/day in hospital.    Allergies: Patient has no known allergies.  Current Outpatient Medications Ordered in Epic   Medication Sig Dispense Refill    traZODone (DESYREL) 100 MG Tab Take 1 tablet by mouth at night as needed for sleep      acetaminophen (TYLENOL) 500 MG Tab Take 1 Tablet by mouth every 6 hours as needed.      albuterol 108 (90 Base) MCG/ACT Aero Soln inhalation aerosol Inhale 1-2 Puffs every 6 hours as needed for Shortness of Breath. 18 g 11    riFAXIMin (XIFAXAN) 550 MG Tab tablet Take 1 Tablet by mouth 2 times a day. 42 Tablet 0    midodrine (PROAMATINE) 5 MG Tab Take 1 Tablet by mouth 3 times a day. 60 Tablet 0    furosemide (LASIX) 40 MG Tab Take 1 Tablet by mouth every day. (Patient taking differently: Take 1 Tablet by mouth 2 times a day.) 30 Tablet 0    spironolactone (ALDACTONE) 100 MG Tab Take 1 Tablet by mouth every day. (Patient taking differently: Take 1 Tablet by mouth 2 times a day.) 30 Tablet 0     No current Epic-ordered facility-administered medications on file.     History reviewed. No pertinent past medical history.  History reviewed. No pertinent surgical history.  Social History     Tobacco Use    Smoking status: Every Day     Packs/day: 1.00     Years: 30.00     Pack years: 30.00     Types: Cigarettes    Smokeless tobacco: Never   Substance Use Topics    Alcohol use: Not Currently     Comment: \"Quit drinking x3.5 months ago\" previously 6pack beer per day x 20 years    Drug use: Yes     Frequency: 7.0 times per week     Types: Marijuana, Oral     Comment: THC gummies     Social History     Social History Narrative    Not on file     History reviewed. No pertinent family history.    ROS:   See HPI      Exam: /70 (BP Location: Right arm, Patient Position: Sitting, BP Cuff Size: Adult)   Pulse 88   " "Temp 37 °C (98.6 °F) (Temporal)   Ht 1.975 m (6' 5.75\")   Wt 109 kg (240 lb)   SpO2 97%  Body mass index is 27.91 kg/m².    General: Well nourished, well developed male in moderate distress, awake and conversant.  Eyes: Normal conjunctiva, anicteric.  Round symmetrical pupils.  ENT: Hearing grossly intact.  No nasal discharge.  Neck: Neck is supple.  No masses or thyromegaly.  CV: No lower extremity edema.  Respiratory: Respirations are nonlabored.  No wheezing.  Abdomen: Firm, tender, distended.  Skin: Warm.  No rashes or ulcers.  MSK: Normal ambulation.  No clubbing or cyanosis.  Neuro: Sensation and CN II-XII grossly normal.  Psych: Alert and oriented.  Cooperative, appropriate mood and affect, normal judgment.  Tearful at times.    Assessment/Plan:  1. Establishing care with new doctor, encounter for    2. Alcoholic cirrhosis of liver with ascites (HCC)  New to examiner, ongoing for patient.  Continue to follow with gastroenterology.  Continue furosemide 40 mg twice daily, midodrine 5 mg 3 times daily, rifaximin 550 mg twice daily, spironolactone 100 mg twice daily, trazodone 100 mg nightly.  Does not need refills at this time.  Referral to pain management at hospice for consultation.  - Referral to Pain Management  - Referral to Hospice    3. Ascites due to alcoholic cirrhosis (HCC)  New to examiner, ongoing for patient.  Continue to follow with gastroenterology.  Scheduled for paracentesis 11/18/2022 and every 2 weeks.  Referral to pain management and hospice for consultation.  - Referral to Pain Management  - Referral to Hospice    4. Decompensated liver disease (HCC)  New to examiner, ongoing for patient, newly diagnosed and secondary to alcohol abuse.  Ammonia in hospital was normal at 19.  Continue furosemide 40 mg twice daily and spironolactone 100 mg twice daily as prescribed on discharge, does not need refills at this time.  Continue to follow with gastroenterology.  Referral to pain management and " hospice for consultation.  - Referral to Pain Management  - Referral to Hospice    5. Hepatic encephalopathy  New to examiner, ongoing for patient.  Continue rifaximin 550 mg twice daily, does not need a refill at this time.  Referral to pain management and hospice for consultation.  - Referral to Pain Management  - Referral to Hospice    6. Shortness of breath  New to examiner.  Patient requesting refill of albuterol inhaler due to shortness of breath related to ascites.  Prescription sent to pharmacy.  - albuterol 108 (90 Base) MCG/ACT Aero Soln inhalation aerosol; Inhale 1-2 Puffs every 6 hours as needed for Shortness of Breath.  Dispense: 18 g; Refill: 11  - Referral to Hospice     Educated in proper administration of medication(s) ordered today including safety, possible SE, risks, benefits, rationale and alternatives to therapy.   Supportive care, differential diagnoses, and indications for immediate follow-up discussed with patient.    Pathogenesis of diagnosis discussed including typical length and natural progression.    Instructed to return to clinic or nearest emergency department for any change in condition, further concerns, or worsening of symptoms.  Patient states understanding of the plan of care and discharge instructions.    Return for Follow-up.    Please note that this dictation was created using voice recognition software. I have made every reasonable attempt to correct obvious errors, but I expect that there are errors of grammar and possibly content that I did not discover before finalizing the note.

## 2022-11-17 NOTE — TELEPHONE ENCOUNTER
Left message for Lesley busby's wife, referral was faxed to [place] [facility] and confirmation was obtained. Left my number for any questions [phone].

## 2022-11-17 NOTE — ASSESSMENT & PLAN NOTE
New to examiner, ongoing for patient.  Patient is taking rifaximin 550 mg twice daily.  Goal BM 2-3/day in hospital.

## 2022-11-18 ENCOUNTER — HOSPITAL ENCOUNTER (OUTPATIENT)
Dept: RADIOLOGY | Facility: MEDICAL CENTER | Age: 52
End: 2022-11-18
Attending: INTERNAL MEDICINE
Payer: COMMERCIAL

## 2022-11-18 DIAGNOSIS — R18.8 OTHER ASCITES: ICD-10-CM

## 2022-11-18 PROCEDURE — P9047 ALBUMIN (HUMAN), 25%, 50ML: HCPCS

## 2022-11-18 PROCEDURE — 49083 ABD PARACENTESIS W/IMAGING: CPT

## 2022-11-18 PROCEDURE — 700111 HCHG RX REV CODE 636 W/ 250 OVERRIDE (IP)

## 2022-11-18 RX ORDER — ALBUMIN (HUMAN) 12.5 G/50ML
SOLUTION INTRAVENOUS
Status: COMPLETED
Start: 2022-11-18 | End: 2022-11-18

## 2022-11-18 RX ORDER — LIDOCAINE HYDROCHLORIDE 10 MG/ML
INJECTION, SOLUTION EPIDURAL; INFILTRATION; INTRACAUDAL; PERINEURAL
Status: DISPENSED
Start: 2022-11-18 | End: 2022-11-18

## 2022-11-18 RX ORDER — ALBUMIN (HUMAN) 12.5 G/50ML
SOLUTION INTRAVENOUS
Status: DISPENSED
Start: 2022-11-18 | End: 2022-11-18

## 2022-11-18 RX ORDER — ALBUMIN (HUMAN) 12.5 G/50ML
75 SOLUTION INTRAVENOUS ONCE
Status: COMPLETED | OUTPATIENT
Start: 2022-11-18 | End: 2022-11-18

## 2022-11-18 RX ADMIN — ALBUMIN (HUMAN) 75 G: 12.5 SOLUTION INTRAVENOUS at 12:52

## 2022-11-18 RX ADMIN — ALBUMIN (HUMAN) 75 G: 5 SOLUTION INTRAVENOUS at 12:52

## 2022-11-18 NOTE — PROGRESS NOTES
OPIR Nursing Note      Paracentesis with Albumin Per Protocol done by Dr. French; RLQ Abdomen access site; 13,600 mL of clear/yellow peritoneal fluid obtained and discarded.   75g gram albumin administered per protocol.    Pt tolerated the procedure well; pt hemodynamically stable pre/intra/post procedure; all questions and concerns answered prior to d/c; patient provided with all appropriate education for procedure; pt d/c home.

## 2022-11-22 ENCOUNTER — HOSPITAL ENCOUNTER (OUTPATIENT)
Dept: LAB | Facility: MEDICAL CENTER | Age: 52
End: 2022-11-22
Attending: INTERNAL MEDICINE
Payer: COMMERCIAL

## 2022-11-22 LAB
ALBUMIN SERPL BCP-MCNC: 2.9 G/DL (ref 3.2–4.9)
ALBUMIN/GLOB SERPL: 0.8 G/DL
ALP SERPL-CCNC: 62 U/L (ref 30–99)
ALT SERPL-CCNC: 6 U/L (ref 2–50)
ANION GAP SERPL CALC-SCNC: 9 MMOL/L (ref 7–16)
AST SERPL-CCNC: 26 U/L (ref 12–45)
BILIRUB SERPL-MCNC: 2.1 MG/DL (ref 0.1–1.5)
BUN SERPL-MCNC: 15 MG/DL (ref 8–22)
CALCIUM SERPL-MCNC: 8.8 MG/DL (ref 8.5–10.5)
CHLORIDE SERPL-SCNC: 97 MMOL/L (ref 96–112)
CO2 SERPL-SCNC: 23 MMOL/L (ref 20–33)
CREAT SERPL-MCNC: 0.96 MG/DL (ref 0.5–1.4)
GFR SERPLBLD CREATININE-BSD FMLA CKD-EPI: 96 ML/MIN/1.73 M 2
GLOBULIN SER CALC-MCNC: 3.5 G/DL (ref 1.9–3.5)
GLUCOSE SERPL-MCNC: 80 MG/DL (ref 65–99)
POTASSIUM SERPL-SCNC: 4 MMOL/L (ref 3.6–5.5)
PROT SERPL-MCNC: 6.4 G/DL (ref 6–8.2)
SODIUM SERPL-SCNC: 129 MMOL/L (ref 135–145)

## 2022-11-22 PROCEDURE — 36415 COLL VENOUS BLD VENIPUNCTURE: CPT

## 2022-11-22 PROCEDURE — 84155 ASSAY OF PROTEIN SERUM: CPT

## 2022-11-22 PROCEDURE — 84165 PROTEIN E-PHORESIS SERUM: CPT

## 2022-11-22 PROCEDURE — 80053 COMPREHEN METABOLIC PANEL: CPT

## 2022-11-24 LAB
ALBUMIN SERPL ELPH-MCNC: 2.76 G/DL (ref 3.75–5.01)
ALPHA1 GLOB SERPL ELPH-MCNC: 0.3 G/DL (ref 0.19–0.46)
ALPHA2 GLOB SERPL ELPH-MCNC: 0.49 G/DL (ref 0.48–1.05)
B-GLOBULIN SERPL ELPH-MCNC: 0.82 G/DL (ref 0.48–1.1)
EER PROT ELECT SER Q1092: ABNORMAL
GAMMA GLOB SERPL ELPH-MCNC: 1.82 G/DL (ref 0.62–1.51)
INTERPRETATION SERPL IFE-IMP: ABNORMAL
MONOCLONAL PROTEIN NL11656: ABNORMAL G/DL
PROT SERPL-MCNC: 6.2 G/DL (ref 6.3–8.2)

## 2022-12-02 ENCOUNTER — HOSPITAL ENCOUNTER (OUTPATIENT)
Dept: RADIOLOGY | Facility: MEDICAL CENTER | Age: 52
End: 2022-12-02
Attending: INTERNAL MEDICINE
Payer: COMMERCIAL

## 2022-12-02 ENCOUNTER — HOSPITAL ENCOUNTER (OUTPATIENT)
Dept: LAB | Facility: MEDICAL CENTER | Age: 52
End: 2022-12-02
Attending: INTERNAL MEDICINE
Payer: COMMERCIAL

## 2022-12-02 DIAGNOSIS — R18.8 OTHER ASCITES: ICD-10-CM

## 2022-12-02 LAB
ALBUMIN FLD-MCNC: 0.9 G/DL
ALBUMIN SERPL BCP-MCNC: 3.1 G/DL (ref 3.2–4.9)
ALBUMIN/GLOB SERPL: 0.8 G/DL
ALP SERPL-CCNC: 70 U/L (ref 30–99)
ALT SERPL-CCNC: 8 U/L (ref 2–50)
AMMONIA PLAS-SCNC: 62 UMOL/L (ref 11–45)
ANION GAP SERPL CALC-SCNC: 9 MMOL/L (ref 7–16)
APPEARANCE FLD: CLEAR
AST SERPL-CCNC: 28 U/L (ref 12–45)
BASOPHILS # BLD AUTO: 1.3 % (ref 0–1.8)
BASOPHILS # BLD: 0.09 K/UL (ref 0–0.12)
BILIRUB SERPL-MCNC: 1.8 MG/DL (ref 0.1–1.5)
BODY FLD TYPE: NORMAL
BODY FLD TYPE: NORMAL
BUN SERPL-MCNC: 15 MG/DL (ref 8–22)
CALCIUM SERPL-MCNC: 9.2 MG/DL (ref 8.5–10.5)
CHLORIDE SERPL-SCNC: 101 MMOL/L (ref 96–112)
CO2 SERPL-SCNC: 22 MMOL/L (ref 20–33)
COLOR FLD: YELLOW
CREAT SERPL-MCNC: 0.93 MG/DL (ref 0.5–1.4)
CYTOLOGY REG CYTOL: NORMAL
EOSINOPHIL # BLD AUTO: 0.27 K/UL (ref 0–0.51)
EOSINOPHIL NFR BLD: 3.8 % (ref 0–6.9)
ERYTHROCYTE [DISTWIDTH] IN BLOOD BY AUTOMATED COUNT: 51.8 FL (ref 35.9–50)
GFR SERPLBLD CREATININE-BSD FMLA CKD-EPI: 99 ML/MIN/1.73 M 2
GLOBULIN SER CALC-MCNC: 3.9 G/DL (ref 1.9–3.5)
GLUCOSE SERPL-MCNC: 90 MG/DL (ref 65–99)
HCT VFR BLD AUTO: 37.5 % (ref 42–52)
HGB BLD-MCNC: 13.2 G/DL (ref 14–18)
HISTIOCYTES NFR FLD: 10 %
IMM GRANULOCYTES # BLD AUTO: 0.03 K/UL (ref 0–0.11)
IMM GRANULOCYTES NFR BLD AUTO: 0.4 % (ref 0–0.9)
INR PPP: 1.27 (ref 0.87–1.13)
LYMPHOCYTES # BLD AUTO: 1.96 K/UL (ref 1–4.8)
LYMPHOCYTES NFR BLD: 27.8 % (ref 22–41)
LYMPHOCYTES NFR FLD: 59 %
MCH RBC QN AUTO: 36.7 PG (ref 27–33)
MCHC RBC AUTO-ENTMCNC: 35.2 G/DL (ref 33.7–35.3)
MCV RBC AUTO: 104.2 FL (ref 81.4–97.8)
MESOTHL CELL NFR FLD: 4 %
MONOCYTES # BLD AUTO: 0.8 K/UL (ref 0–0.85)
MONOCYTES NFR BLD AUTO: 11.3 % (ref 0–13.4)
MONONUC CELLS NFR FLD: 15 %
NEUTROPHILS # BLD AUTO: 3.9 K/UL (ref 1.82–7.42)
NEUTROPHILS NFR BLD: 55.4 % (ref 44–72)
NEUTROPHILS NFR FLD: 10 %
NRBC # BLD AUTO: 0 K/UL
NRBC BLD-RTO: 0 /100 WBC
PLATELET # BLD AUTO: 163 K/UL (ref 164–446)
PMV BLD AUTO: 8.9 FL (ref 9–12.9)
POTASSIUM SERPL-SCNC: 4.3 MMOL/L (ref 3.6–5.5)
PROT FLD-MCNC: 1.7 G/DL
PROT SERPL-MCNC: 7 G/DL (ref 6–8.2)
PROTHROMBIN TIME: 15.7 SEC (ref 12–14.6)
RBC # BLD AUTO: 3.6 M/UL (ref 4.7–6.1)
RBC # FLD: <2000 CELLS/UL
SODIUM SERPL-SCNC: 132 MMOL/L (ref 135–145)
TSH SERPL DL<=0.005 MIU/L-ACNC: 1.84 UIU/ML (ref 0.38–5.33)
WBC # BLD AUTO: 7.1 K/UL (ref 4.8–10.8)
WBC # FLD: 125 CELLS/UL
WBC OTHER NFR FLD: 2 %

## 2022-12-02 PROCEDURE — 80503 PATH CLIN CONSLTJ SF 5-20: CPT

## 2022-12-02 PROCEDURE — 82105 ALPHA-FETOPROTEIN SERUM: CPT

## 2022-12-02 PROCEDURE — 85025 COMPLETE CBC W/AUTO DIFF WBC: CPT

## 2022-12-02 PROCEDURE — 49083 ABD PARACENTESIS W/IMAGING: CPT

## 2022-12-02 PROCEDURE — 87015 SPECIMEN INFECT AGNT CONCNTJ: CPT

## 2022-12-02 PROCEDURE — 36415 COLL VENOUS BLD VENIPUNCTURE: CPT

## 2022-12-02 PROCEDURE — 82042 OTHER SOURCE ALBUMIN QUAN EA: CPT

## 2022-12-02 PROCEDURE — 80053 COMPREHEN METABOLIC PANEL: CPT

## 2022-12-02 PROCEDURE — 700111 HCHG RX REV CODE 636 W/ 250 OVERRIDE (IP)

## 2022-12-02 PROCEDURE — P9047 ALBUMIN (HUMAN), 25%, 50ML: HCPCS

## 2022-12-02 PROCEDURE — 82140 ASSAY OF AMMONIA: CPT

## 2022-12-02 PROCEDURE — 87205 SMEAR GRAM STAIN: CPT

## 2022-12-02 PROCEDURE — 85610 PROTHROMBIN TIME: CPT

## 2022-12-02 PROCEDURE — 84157 ASSAY OF PROTEIN OTHER: CPT

## 2022-12-02 PROCEDURE — 88112 CYTOPATH CELL ENHANCE TECH: CPT

## 2022-12-02 PROCEDURE — 88305 TISSUE EXAM BY PATHOLOGIST: CPT

## 2022-12-02 PROCEDURE — 89051 BODY FLUID CELL COUNT: CPT

## 2022-12-02 PROCEDURE — 84443 ASSAY THYROID STIM HORMONE: CPT

## 2022-12-02 PROCEDURE — 87070 CULTURE OTHR SPECIMN AEROBIC: CPT

## 2022-12-02 RX ORDER — ALBUMIN (HUMAN) 12.5 G/50ML
75 SOLUTION INTRAVENOUS ONCE
Status: COMPLETED | OUTPATIENT
Start: 2022-12-02 | End: 2022-12-02

## 2022-12-02 RX ORDER — ALBUMIN (HUMAN) 12.5 G/50ML
SOLUTION INTRAVENOUS
Status: COMPLETED
Start: 2022-12-02 | End: 2022-12-02

## 2022-12-02 RX ORDER — ALBUMIN (HUMAN) 12.5 G/50ML
SOLUTION INTRAVENOUS
Status: DISPENSED
Start: 2022-12-02 | End: 2022-12-02

## 2022-12-02 RX ORDER — LIDOCAINE HYDROCHLORIDE 20 MG/ML
INJECTION, SOLUTION INFILTRATION; PERINEURAL
Status: DISPENSED
Start: 2022-12-02 | End: 2022-12-02

## 2022-12-02 RX ADMIN — ALBUMIN (HUMAN) 75 G: 5 SOLUTION INTRAVENOUS at 13:30

## 2022-12-02 RX ADMIN — ALBUMIN (HUMAN) 75 G: 12.5 SOLUTION INTRAVENOUS at 13:30

## 2022-12-02 NOTE — PROGRESS NOTES
US guided therapeutic paracentesis done by Dr. colon;(no H&P required as this is a NON SEDATION procedure) RLQ access site; 69763uL obtained and discarded; pt tolerated the procedure well; pt hemodynamically stable pre/intra/post procedure. IV started, albumin 8 bottles given per protocol, IV d/c'ed upon completion of albumin administration. all questions and concerns answered prior to d/c; patient provided with all appropriate education for procedure; pt d/c home.      Sent specimen to lab

## 2022-12-03 LAB
AFP-TM SERPL-MCNC: 7 NG/ML (ref 0–9)
GRAM STN SPEC: NORMAL
PATH REV: NORMAL
PATH REV: NORMAL
SIGNIFICANT IND 70042: NORMAL
SITE SITE: NORMAL
SOURCE SOURCE: NORMAL

## 2022-12-03 PROCEDURE — 80503 PATH CLIN CONSLTJ SF 5-20: CPT

## 2022-12-16 ENCOUNTER — HOSPITAL ENCOUNTER (OUTPATIENT)
Dept: RADIOLOGY | Facility: MEDICAL CENTER | Age: 52
End: 2022-12-16
Attending: INTERNAL MEDICINE
Payer: COMMERCIAL

## 2022-12-16 DIAGNOSIS — R18.8 OTHER ASCITES: ICD-10-CM

## 2022-12-16 PROCEDURE — C1729 CATH, DRAINAGE: HCPCS

## 2022-12-16 NOTE — PROGRESS NOTES
US guided therapeutic paracentesis done by Dr. oh;(no H&P required as this is a NON SEDATION procedure) RLQ access site; 3600mL obtained and discarded; pt tolerated the procedure well; pt hemodynamically stable pre/intra/post procedure.. all questions and concerns answered prior to d/c; patient provided with all appropriate education for procedure; pt d/c home.

## 2022-12-20 ENCOUNTER — HOSPITAL ENCOUNTER (OUTPATIENT)
Dept: RADIOLOGY | Facility: MEDICAL CENTER | Age: 52
End: 2022-12-20
Attending: INTERNAL MEDICINE
Payer: COMMERCIAL

## 2022-12-20 DIAGNOSIS — K74.60 HEPATIC CIRRHOSIS, UNSPECIFIED HEPATIC CIRRHOSIS TYPE, UNSPECIFIED WHETHER ASCITES PRESENT (HCC): ICD-10-CM

## 2022-12-20 DIAGNOSIS — R18.8 OTHER ASCITES: ICD-10-CM

## 2022-12-20 DIAGNOSIS — R10.84 ABDOMINAL PAIN, GENERALIZED: ICD-10-CM

## 2022-12-20 DIAGNOSIS — R63.4 UNEXPLAINED WEIGHT LOSS: ICD-10-CM

## 2022-12-20 PROCEDURE — 700117 HCHG RX CONTRAST REV CODE 255: Performed by: INTERNAL MEDICINE

## 2022-12-20 PROCEDURE — 74177 CT ABD & PELVIS W/CONTRAST: CPT

## 2022-12-20 RX ADMIN — IOHEXOL 10 ML: 240 INJECTION, SOLUTION INTRATHECAL; INTRAVASCULAR; INTRAVENOUS; ORAL at 11:49

## 2022-12-20 RX ADMIN — IOHEXOL 100 ML: 350 INJECTION, SOLUTION INTRAVENOUS at 11:50

## 2022-12-30 ENCOUNTER — HOSPITAL ENCOUNTER (OUTPATIENT)
Dept: RADIOLOGY | Facility: MEDICAL CENTER | Age: 52
End: 2022-12-30
Attending: INTERNAL MEDICINE
Payer: COMMERCIAL

## 2022-12-30 DIAGNOSIS — R18.8 OTHER ASCITES: ICD-10-CM

## 2022-12-30 PROCEDURE — 49083 ABD PARACENTESIS W/IMAGING: CPT

## 2022-12-30 PROCEDURE — 700111 HCHG RX REV CODE 636 W/ 250 OVERRIDE (IP)

## 2022-12-30 PROCEDURE — P9047 ALBUMIN (HUMAN), 25%, 50ML: HCPCS

## 2022-12-30 RX ORDER — ALBUMIN (HUMAN) 12.5 G/50ML
SOLUTION INTRAVENOUS
Status: COMPLETED
Start: 2022-12-30 | End: 2022-12-30

## 2022-12-30 RX ADMIN — ALBUMIN (HUMAN) 50 G: 5 SOLUTION INTRAVENOUS at 11:30

## 2022-12-30 NOTE — PROGRESS NOTES
US guided therapeutic paracentesis done by Dr. elena;(no H&P required as this is a NON SEDATION procedure) RLQ access site; 8250 mL obtained and discarded; pt tolerated the procedure well; pt hemodynamically stable pre/intra/post procedure. IV started, albumin 50 G given per protocol, IV d/c'ed upon completion of albumin administration. all questions and concerns answered prior to d/c; patient provided with all appropriate education for procedure; pt d/c home.

## 2023-01-10 ENCOUNTER — HOSPITAL ENCOUNTER (OUTPATIENT)
Dept: RADIOLOGY | Facility: MEDICAL CENTER | Age: 53
End: 2023-01-10
Attending: INTERNAL MEDICINE
Payer: COMMERCIAL

## 2023-01-10 DIAGNOSIS — R18.8 OTHER ASCITES: ICD-10-CM

## 2023-01-10 DIAGNOSIS — K71.7 HEPATIC CIRRHOSIS DUE TO TOXIN: ICD-10-CM

## 2023-01-10 PROCEDURE — 49083 ABD PARACENTESIS W/IMAGING: CPT

## 2023-01-20 ENCOUNTER — HOSPITAL ENCOUNTER (OUTPATIENT)
Dept: RADIOLOGY | Facility: MEDICAL CENTER | Age: 53
End: 2023-01-20
Attending: INTERNAL MEDICINE
Payer: COMMERCIAL

## 2023-01-20 DIAGNOSIS — K71.7 HEPATIC CIRRHOSIS DUE TO TOXIN: ICD-10-CM

## 2023-01-20 DIAGNOSIS — R18.8 OTHER ASCITES: ICD-10-CM

## 2023-01-20 PROCEDURE — 49083 ABD PARACENTESIS W/IMAGING: CPT

## 2023-01-20 NOTE — PROGRESS NOTES
OPIR Nursing Note      Paracentesis with Albumin Per Protocol done by Dr. Pedroza; RLQ x2 access sites; 4100 mL of clear/yellow peritoneal fluid obtained and discarded.   No albumin administered per protocol.    Pt tolerated the procedure well; pt hemodynamically stable pre/intra/post procedure; all questions and concerns answered prior to d/c; patient provided with all appropriate education for procedure; pt d/c home.

## 2023-02-03 ENCOUNTER — HOSPITAL ENCOUNTER (OUTPATIENT)
Dept: RADIOLOGY | Facility: MEDICAL CENTER | Age: 53
End: 2023-02-03
Attending: INTERNAL MEDICINE
Payer: COMMERCIAL

## 2023-02-03 DIAGNOSIS — K71.7 HEPATIC CIRRHOSIS DUE TO TOXIN: ICD-10-CM

## 2023-02-03 DIAGNOSIS — R18.8 OTHER ASCITES: ICD-10-CM

## 2023-02-03 PROCEDURE — 49083 ABD PARACENTESIS W/IMAGING: CPT

## 2023-02-03 NOTE — PROGRESS NOTES
US guided therapeutic paracentesis done by Dr. Hoffmann;(no H&P required as this is a NON SEDATION procedure) RLQ access site; 2050mL obtained and discarded; pt tolerated the procedure well; pt hemodynamically stable pre/intra/post procedure. . all questions and concerns answered prior to d/c; patient provided with all appropriate education for procedure; pt d/c home.

## 2023-02-17 ENCOUNTER — APPOINTMENT (OUTPATIENT)
Dept: RADIOLOGY | Facility: MEDICAL CENTER | Age: 53
End: 2023-02-17
Attending: INTERNAL MEDICINE
Payer: COMMERCIAL

## 2023-02-24 ENCOUNTER — APPOINTMENT (OUTPATIENT)
Dept: RADIOLOGY | Facility: MEDICAL CENTER | Age: 53
End: 2023-02-24
Attending: INTERNAL MEDICINE
Payer: COMMERCIAL

## 2023-03-07 ENCOUNTER — APPOINTMENT (OUTPATIENT)
Dept: RADIOLOGY | Facility: MEDICAL CENTER | Age: 53
End: 2023-03-07
Attending: INTERNAL MEDICINE
Payer: COMMERCIAL

## 2023-03-17 ENCOUNTER — APPOINTMENT (OUTPATIENT)
Dept: RADIOLOGY | Facility: MEDICAL CENTER | Age: 53
End: 2023-03-17
Attending: INTERNAL MEDICINE
Payer: COMMERCIAL

## 2023-03-31 ENCOUNTER — APPOINTMENT (OUTPATIENT)
Dept: RADIOLOGY | Facility: MEDICAL CENTER | Age: 53
End: 2023-03-31
Attending: INTERNAL MEDICINE
Payer: COMMERCIAL

## 2023-07-14 NOTE — PROGRESS NOTES
Hospital Medicine Daily Progress Note    Date of Service  10/4/2022    Chief Complaint  Rodolfo Billings is a 51 y.o. male admitted 10/3/2022 with decompensated liver cirrhosis    Hospital Course  51 y.o M with hx of EtOH abuse (last drink was 3 months ago) and newly diagnosed liver cirrhosis who presented 10/3/2022 comes into the ED for abdominal pain, distention and confusion.     He was admitted at [facility] where he was found to have liver cirrhosis. He had extensive work-up at that time including paracentesis, negative hepatitis panel, negative anti-smooth muscle antibody, positive DAKOTA.  He did have low ceruloplasmin levels.  Paracentesis did not show any evidence of SBP.  He had 2 different paracentesis.  Cardiac echo was negative for any acute findings.  He was discharged on ciprofloxacin, Lasix, Aldactone. Work-up for malignancy was negative. Patient wanted to be discharged from [place]. A night prior to admission, the patient complained that he was confused and having tremors.  Patient states that in his 20s he drank hard alcohol.  Patient denies any high salt intake.    MELD 19 on admission. With confusion, initial impression was hepatic encephalopathy with decompensated liver cirrhosis - gross abdominal distension.     Interval Problem Update  10/4  Vitals reviewed; afebrile.  The rest of the vitals within normal parameters.  Pain scale reported - 2-6 in abdomen   Blood glucose in last 24hrs - around 100s  I/O - BM prior to admission to CDU    Paracentesis performed - 8.8L straw color fluid withdrawn    At bedside when seen after paracentesis, mild tremors and overall thought content appropriate but a bit odd at time. Reported fatigue and tiredness. Abd less distended but still can appreciate distension. Non-tender. Plan to replenish protein with albumin and to continue IV diuretic for one more evening while monitoring goal BM 2-3 on lactulose and rifaximin.     Labs  reviewed     I have discussed this patient's plan of care and discharge plan at IDT rounds today with Case Management, Nursing, Nursing leadership, and other members of the IDT team.    Consultants/Specialty  N/A    Code Status  Full Code    Disposition  Patient is not medically cleared for discharge.   Anticipate discharge to to home with close outpatient follow-up.  I have placed the appropriate orders for post-discharge needs.    Review of Systems  Review of Systems   Constitutional:  Negative for chills, fever and malaise/fatigue.   HENT:  Negative for congestion and sore throat.    Eyes:  Negative for blurred vision and double vision.   Respiratory:  Negative for cough, sputum production and shortness of breath.    Cardiovascular:  Negative for chest pain, palpitations, orthopnea, leg swelling and PND.   Gastrointestinal:  Negative for abdominal pain, heartburn, nausea and vomiting.   Genitourinary:  Negative for dysuria, frequency and urgency.   Musculoskeletal:  Negative for myalgias and neck pain.   Neurological:  Positive for tremors. Negative for dizziness, focal weakness and headaches.   Psychiatric/Behavioral:  Negative for depression.       Physical Exam  Temp:  [36.4 °C (97.5 °F)-37.2 °C (98.9 °F)] 36.4 °C (97.5 °F)  Pulse:  [] 96  Resp:  [16-25] 16  BP: ()/(60-86) 119/77  SpO2:  [90 %-99 %] 98 %    Physical Exam  Vitals and nursing note reviewed.   Constitutional:       General: He is not in acute distress.     Appearance: Normal appearance. He is not ill-appearing.   HENT:      Head: Normocephalic and atraumatic.      Mouth/Throat:      Mouth: Mucous membranes are moist.      Pharynx: Oropharynx is clear.   Eyes:      General: No scleral icterus.     Conjunctiva/sclera: Conjunctivae normal.   Cardiovascular:      Rate and Rhythm: Normal rate and regular rhythm.      Pulses: Normal pulses.      Heart sounds: Normal heart sounds.   Pulmonary:      Effort: Pulmonary effort is normal. No  respiratory distress.      Breath sounds: Normal breath sounds. No wheezing.   Abdominal:      General: Bowel sounds are normal. There is distension.      Palpations: Abdomen is soft.      Tenderness: There is no abdominal tenderness.   Musculoskeletal:         General: No swelling or tenderness. Normal range of motion.   Skin:     General: Skin is warm and dry.      Capillary Refill: Capillary refill takes less than 2 seconds.   Neurological:      General: No focal deficit present.      Mental Status: He is alert and oriented to person, place, and time. Mental status is at baseline.      Comments: Tremors   Psychiatric:         Mood and Affect: Mood normal.       Fluids  No intake or output data in the 24 hours ending 10/04/22 1327    Laboratory  Recent Labs     10/03/22  1142 10/04/22  0356   WBC 9.7 7.5   RBC 3.64* 3.28*   HEMOGLOBIN 13.8* 12.5*   HEMATOCRIT 39.9* 36.0*   .6* 109.8*   MCH 37.9* 38.1*   MCHC 34.6 34.7   RDW 51.9* 52.1*   PLATELETCT 152* 110*   MPV 9.2 9.4     Recent Labs     10/03/22  1142 10/04/22  0356   SODIUM 132* 132*   POTASSIUM 4.3 3.8   CHLORIDE 97 100   CO2 25 21   GLUCOSE 112* 97   BUN 12 12   CREATININE 0.88 0.82   CALCIUM 9.2 8.8     Recent Labs     10/03/22  1142   INR 1.50*               Imaging  US-PARACENTESIS, ABD WITH IMAGING   Final Result      1. Ultrasound-guided diagnostic and therapeutic paracentesis of the left lower quadrant of the abdominal wall.      2. 8800 mL of fluid withdrawn.      DX-CHEST-PORTABLE (1 VIEW)   Final Result      1.  Minimal bibasilar atelectasis.   2.  No pneumonia or pneumothorax.           Assessment/Plan  * Decompensated liver disease (HCC)- (present on admission)  Assessment & Plan  Secondary to alcohol abuse - newly diagnose  Ammonia 19 (wnl)  MELD 19  Continue monitoring  Start IV lasix (will resume for another 24hrs) and continue home Aldactone  Monitor weight and strict ins and outs    S/p 8.8L paracentesis (straw color) - will  replenish with albumin as per protocol   Monitor vitals    CM team contacted to help establish outpatient care    Hepatic encephalopathy  Assessment & Plan  Started rifaximin and lactulose  Goal BM 2-3 per day  No BM since arrival to CDU    Continued to monitor though mental status appeared to be close to baseline     Ascites due to alcoholic cirrhosis (HCC)  Assessment & Plan  S/p 8.8L paracentesis (straw color) - will replenish with albumin as per protocol   Monitor vitals         VTE prophylaxis: pharmacologic prophylaxis contraindicated due to low risk and Pt is ambulatory    I have performed a physical exam and reviewed and updated ROS and Plan today (10/4/2022).         Unable to assess due to medical condition

## 2023-09-22 ENCOUNTER — HOSPITAL ENCOUNTER (OUTPATIENT)
Dept: LAB | Facility: MEDICAL CENTER | Age: 53
End: 2023-09-22
Attending: PHYSICIAN ASSISTANT
Payer: COMMERCIAL

## 2023-09-22 LAB
ALBUMIN SERPL BCP-MCNC: 4 G/DL (ref 3.2–4.9)
ALBUMIN/GLOB SERPL: 1.3 G/DL
ALP SERPL-CCNC: 75 U/L (ref 30–99)
ALT SERPL-CCNC: 10 U/L (ref 2–50)
ANION GAP SERPL CALC-SCNC: 11 MMOL/L (ref 7–16)
APTT PPP: 36.9 SEC (ref 24.7–36)
AST SERPL-CCNC: 22 U/L (ref 12–45)
BILIRUB CONJ SERPL-MCNC: <0.2 MG/DL (ref 0.1–0.5)
BILIRUB INDIRECT SERPL-MCNC: NORMAL MG/DL (ref 0–1)
BILIRUB SERPL-MCNC: 0.7 MG/DL (ref 0.1–1.5)
BUN SERPL-MCNC: 16 MG/DL (ref 8–22)
CALCIUM ALBUM COR SERPL-MCNC: 9.4 MG/DL (ref 8.5–10.5)
CALCIUM SERPL-MCNC: 9.4 MG/DL (ref 8.5–10.5)
CHLORIDE SERPL-SCNC: 105 MMOL/L (ref 96–112)
CO2 SERPL-SCNC: 21 MMOL/L (ref 20–33)
CREAT SERPL-MCNC: 1.04 MG/DL (ref 0.5–1.4)
GFR SERPLBLD CREATININE-BSD FMLA CKD-EPI: 86 ML/MIN/1.73 M 2
GLOBULIN SER CALC-MCNC: 3.1 G/DL (ref 1.9–3.5)
GLUCOSE SERPL-MCNC: 120 MG/DL (ref 65–99)
INR PPP: 1.15 (ref 0.87–1.13)
POTASSIUM SERPL-SCNC: 4.3 MMOL/L (ref 3.6–5.5)
PROT SERPL-MCNC: 7.1 G/DL (ref 6–8.2)
PROTHROMBIN TIME: 14.8 SEC (ref 12–14.6)
SODIUM SERPL-SCNC: 137 MMOL/L (ref 135–145)

## 2023-09-22 PROCEDURE — 85610 PROTHROMBIN TIME: CPT

## 2023-09-22 PROCEDURE — 82248 BILIRUBIN DIRECT: CPT

## 2023-09-22 PROCEDURE — 85730 THROMBOPLASTIN TIME PARTIAL: CPT

## 2023-09-22 PROCEDURE — 36415 COLL VENOUS BLD VENIPUNCTURE: CPT

## 2023-09-22 PROCEDURE — 82105 ALPHA-FETOPROTEIN SERUM: CPT

## 2023-09-22 PROCEDURE — 80053 COMPREHEN METABOLIC PANEL: CPT

## 2023-09-24 LAB — AFP-TM SERPL-MCNC: 6 NG/ML (ref 0–9)

## 2023-10-08 ENCOUNTER — TELEPHONE (OUTPATIENT)
Dept: URGENT CARE | Facility: PHYSICIAN GROUP | Age: 53
End: 2023-10-08
Payer: COMMERCIAL

## 2023-10-08 NOTE — LETTER
10/8/2023            Rodolfo Billings  [address] [place] [address]              Dear Rodolfo,    Your care is very important to us, and we have noticed that on 10/03/2023, you missed your appointment with Magaly Flowers M.D. at [facility]       We’re committed to providing you with the best care possible. Your appointment time is reserved for you and your provider to discuss any current or new health concerns and, together, determine the best plan of care for you. Please call [phone] to reschedule at your earliest convenience.        In some cases, [facility] offers additional resources to make your healthcare more accessible, including transportation assistance, financial assistance and virtual visits. To learn more about these resources, please call [phone].       In order to keep you as informed as possible, below is a brief summary of our policy regarding missed appointments:        If a patient “No Shows”??three (3) or more appointments within a rolling 12-month           period, they may be dismissed from the practice for failure to follow clinician      recommendations.     If you have any concerns regarding the care you are receiving, please talk with your provider or call the office at [phone] and request to speak with the Practice . We’re committed to providing excellent care, and your feedback is invaluable.          Sincerely,  Magaly Flowers M.D.

## 2023-10-11 ENCOUNTER — OFFICE VISIT (OUTPATIENT)
Dept: MEDICAL GROUP | Facility: PHYSICIAN GROUP | Age: 53
End: 2023-10-11
Payer: COMMERCIAL

## 2023-10-11 VITALS
BODY MASS INDEX: 26.75 KG/M2 | WEIGHT: 230 LBS | HEART RATE: 74 BPM | OXYGEN SATURATION: 96 % | SYSTOLIC BLOOD PRESSURE: 126 MMHG | DIASTOLIC BLOOD PRESSURE: 68 MMHG | TEMPERATURE: 97.9 F

## 2023-10-11 DIAGNOSIS — Z23 NEED FOR VACCINATION: ICD-10-CM

## 2023-10-11 DIAGNOSIS — K70.31 ALCOHOLIC CIRRHOSIS OF LIVER WITH ASCITES (HCC): ICD-10-CM

## 2023-10-11 DIAGNOSIS — K70.31 ASCITES DUE TO ALCOHOLIC CIRRHOSIS (HCC): ICD-10-CM

## 2023-10-11 DIAGNOSIS — K74.69 DECOMPENSATED LIVER DISEASE (HCC): ICD-10-CM

## 2023-10-11 DIAGNOSIS — R10.84 GENERALIZED ABDOMINAL PAIN: ICD-10-CM

## 2023-10-11 PROBLEM — K76.82 HEPATIC ENCEPHALOPATHY (HCC): Status: RESOLVED | Noted: 2022-10-03 | Resolved: 2023-10-11

## 2023-10-11 PROCEDURE — 90472 IMMUNIZATION ADMIN EACH ADD: CPT | Performed by: STUDENT IN AN ORGANIZED HEALTH CARE EDUCATION/TRAINING PROGRAM

## 2023-10-11 PROCEDURE — 90715 TDAP VACCINE 7 YRS/> IM: CPT | Performed by: STUDENT IN AN ORGANIZED HEALTH CARE EDUCATION/TRAINING PROGRAM

## 2023-10-11 PROCEDURE — 90471 IMMUNIZATION ADMIN: CPT | Performed by: STUDENT IN AN ORGANIZED HEALTH CARE EDUCATION/TRAINING PROGRAM

## 2023-10-11 PROCEDURE — 99214 OFFICE O/P EST MOD 30 MIN: CPT | Mod: 25 | Performed by: STUDENT IN AN ORGANIZED HEALTH CARE EDUCATION/TRAINING PROGRAM

## 2023-10-11 PROCEDURE — 90686 IIV4 VACC NO PRSV 0.5 ML IM: CPT | Performed by: STUDENT IN AN ORGANIZED HEALTH CARE EDUCATION/TRAINING PROGRAM

## 2023-10-11 PROCEDURE — 3074F SYST BP LT 130 MM HG: CPT | Performed by: STUDENT IN AN ORGANIZED HEALTH CARE EDUCATION/TRAINING PROGRAM

## 2023-10-11 PROCEDURE — 3078F DIAST BP <80 MM HG: CPT | Performed by: STUDENT IN AN ORGANIZED HEALTH CARE EDUCATION/TRAINING PROGRAM

## 2023-10-11 RX ORDER — OMEPRAZOLE 20 MG/1
CAPSULE, DELAYED RELEASE ORAL
COMMUNITY
Start: 2023-10-05

## 2023-10-11 ASSESSMENT — PATIENT HEALTH QUESTIONNAIRE - PHQ9: CLINICAL INTERPRETATION OF PHQ2 SCORE: 0

## 2023-10-11 ASSESSMENT — ENCOUNTER SYMPTOMS
FEVER: 0
CHILLS: 0
SHORTNESS OF BREATH: 0
PALPITATIONS: 0

## 2023-10-11 ASSESSMENT — FIBROSIS 4 INDEX: FIB4 SCORE: 2.18

## 2023-10-11 NOTE — LETTER
[facility]  Magaly Flowers M.D.  [address] [address]  Fax: [phone]   Authorization for Release/Disclosure of   Protected Health Information   Name: RODOLFO SETH : 1970 SSN: [id number]   Address: [address] [place] [id number] Phone:    There are no phone numbers on file.   I authorize the entity listed below to release/disclose the PHI below to:   [facility]/Magaly Flowers M.D. and Magaly Flowers M.D.   Provider or Entity Name: Joshua Lewis     Address          [address] [address] Phone:      Fax:     Reason for request: continuity of care   Information to be released:    [  ] LAST COLONOSCOPY,  including any PATH REPORT and follow-up  [  ] LAST FIT/COLOGUARD RESULT [  ] LAST DEXA  [  ] LAST MAMMOGRAM  [  ] LAST PAP  [  ] LAST LABS [  ] RETINA EXAM REPORT  [  ] IMMUNIZATION RECORDS  [x] Release all info      [  ] Check here and initial the line next to each item to release ALL health information INCLUDING  _____ Care and treatment for drug and / or alcohol abuse  _____ HIV testing, infection status, or AIDS  _____ Genetic Testing    DATES OF SERVICE OR TIME PERIOD TO BE DISCLOSED: _____________  I understand and acknowledge that:  * This Authorization may be revoked at any time by you in writing, except if your health information has already been used or disclosed.  * Your health information that will be used or disclosed as a result of you signing this authorization could be re-disclosed by the recipient. If this occurs, your re-disclosed health information may no longer be protected by State or Federal laws.  * You may refuse to sign this Authorization. Your refusal will not affect your ability to obtain treatment.  * This Authorization becomes effective upon signing and will  on (date) __________.      If no date is indicated, this Authorization will  one (1) year from the signature date.    Name: Rodolfo Seth  Signature: Date:   10/11/2023      PLEASE FAX REQUESTED RECORDS BACK TO: [phone]

## 2023-10-11 NOTE — PROGRESS NOTES
Subjective:     CC: Establish Care    HPI:   Rodolfo presents today with his wife to establish care.    Abdominal Pain  -Reports having diffuse abdominal pain that is mainly in the RUQ  -Described as a burning, sharp pain  -Rates 4/10  -Last about an 1-3 hours  -Triggered stress, some physical activity  -Alleviated with rest and napping    Night Sweats  -Reports having night sweats for the past year   -States they started after he began taking medications      accompanied by Spouse    Problem   Liver Cirrhosis (Hcc)    Onset: 9/2022     Decompensated Liver Disease (Hcc)    Diagnosed: September of 2022  Specialist: Dr. [facility]  Medication: Lasix 40mg qd, Rifaximin 550mg BID (does not take it regularly), Sprinolactone 100mg qd     Ascites Due to Alcoholic Cirrhosis (Hcc)    Chronic, stable  Last paracentesis was about 6 months ago     Hepatic Encephalopathy (Hcc) (Resolved)       Health Maintenance: Completed  Received influenza and tdap vaccine today.    ROS:  Review of Systems   Constitutional:  Negative for chills and fever.   Respiratory:  Negative for shortness of breath.    Cardiovascular:  Negative for chest pain and palpitations.       Objective:     Exam:  /68   Pulse 74   Temp 36.6 °C (97.9 °F)   Wt 104 kg (230 lb)   SpO2 96%   BMI 26.75 kg/m²  Body mass index is 26.75 kg/m².    Physical Exam  Constitutional:       Appearance: Normal appearance.   Cardiovascular:      Rate and Rhythm: Normal rate and regular rhythm.   Pulmonary:      Effort: Pulmonary effort is normal. No respiratory distress.      Breath sounds: Normal breath sounds. No stridor. No wheezing or rhonchi.   Neurological:      Mental Status: He is alert.             Labs:     Assessment & Plan:     52 y.o. male with the following -     1. Decompensated liver disease (HCC)  2. Ascites due to alcoholic cirrhosis (HCC)  3. Alcoholic cirrhosis of liver with ascites (HCC)  Chronic, stable. Diagnosed with decompensated liver  disease in 9/2022. Patient was hospitalized at the [facility] in the ICU for 7 days in 9/2022. Patient has established care with Dr. [facility] ([facility]) and last seen two weeks ago. Patient reports he was given labs to complete. Will request records from Dr. Lewis.    4. Generalized abdominal pain  Chronic, ongoing. Likely 2/2 ascites due to hepatic cirrhosis. Will await records from Dr. Hdz office prior to considering management of pain.    5. Need for vaccination  - INFLUENZA VACCINE QUAD INJ (PF)  - Tdap =>8yo IM          Return in about 4 weeks (around 11/8/2023) for Annual.

## 2023-11-03 ENCOUNTER — HOSPITAL ENCOUNTER (OUTPATIENT)
Dept: LAB | Facility: MEDICAL CENTER | Age: 53
End: 2023-11-03
Attending: PHYSICIAN ASSISTANT
Payer: COMMERCIAL

## 2023-11-03 LAB
ALBUMIN SERPL BCP-MCNC: 3.9 G/DL (ref 3.2–4.9)
ALBUMIN/GLOB SERPL: 1.1 G/DL
ALP SERPL-CCNC: 80 U/L (ref 30–99)
ALT SERPL-CCNC: 9 U/L (ref 2–50)
AMMONIA PLAS-SCNC: 36 UMOL/L (ref 11–45)
ANION GAP SERPL CALC-SCNC: 9 MMOL/L (ref 7–16)
AST SERPL-CCNC: 18 U/L (ref 12–45)
BASOPHILS # BLD AUTO: 1.4 % (ref 0–1.8)
BASOPHILS # BLD: 0.09 K/UL (ref 0–0.12)
BILIRUB SERPL-MCNC: 0.5 MG/DL (ref 0.1–1.5)
BUN SERPL-MCNC: 16 MG/DL (ref 8–22)
CALCIUM ALBUM COR SERPL-MCNC: 9.7 MG/DL (ref 8.5–10.5)
CALCIUM SERPL-MCNC: 9.6 MG/DL (ref 8.5–10.5)
CHLORIDE SERPL-SCNC: 98 MMOL/L (ref 96–112)
CO2 SERPL-SCNC: 26 MMOL/L (ref 20–33)
CREAT SERPL-MCNC: 1.09 MG/DL (ref 0.5–1.4)
EOSINOPHIL # BLD AUTO: 0.25 K/UL (ref 0–0.51)
EOSINOPHIL NFR BLD: 3.9 % (ref 0–6.9)
ERYTHROCYTE [DISTWIDTH] IN BLOOD BY AUTOMATED COUNT: 45.2 FL (ref 35.9–50)
GFR SERPLBLD CREATININE-BSD FMLA CKD-EPI: 82 ML/MIN/1.73 M 2
GLOBULIN SER CALC-MCNC: 3.7 G/DL (ref 1.9–3.5)
GLUCOSE SERPL-MCNC: 193 MG/DL (ref 65–99)
HCT VFR BLD AUTO: 44.7 % (ref 42–52)
HGB BLD-MCNC: 15.4 G/DL (ref 14–18)
IMM GRANULOCYTES # BLD AUTO: 0.02 K/UL (ref 0–0.11)
IMM GRANULOCYTES NFR BLD AUTO: 0.3 % (ref 0–0.9)
INR PPP: 1.08 (ref 0.87–1.13)
LYMPHOCYTES # BLD AUTO: 1.8 K/UL (ref 1–4.8)
LYMPHOCYTES NFR BLD: 27.8 % (ref 22–41)
MCH RBC QN AUTO: 34.6 PG (ref 27–33)
MCHC RBC AUTO-ENTMCNC: 34.5 G/DL (ref 32.3–36.5)
MCV RBC AUTO: 100.4 FL (ref 81.4–97.8)
MONOCYTES # BLD AUTO: 0.48 K/UL (ref 0–0.85)
MONOCYTES NFR BLD AUTO: 7.4 % (ref 0–13.4)
NEUTROPHILS # BLD AUTO: 3.83 K/UL (ref 1.82–7.42)
NEUTROPHILS NFR BLD: 59.2 % (ref 44–72)
NRBC # BLD AUTO: 0 K/UL
NRBC BLD-RTO: 0 /100 WBC (ref 0–0.2)
PLATELET # BLD AUTO: 143 K/UL (ref 164–446)
PMV BLD AUTO: 10 FL (ref 9–12.9)
POTASSIUM SERPL-SCNC: 4.6 MMOL/L (ref 3.6–5.5)
PROT SERPL-MCNC: 7.6 G/DL (ref 6–8.2)
PROTHROMBIN TIME: 14.1 SEC (ref 12–14.6)
RBC # BLD AUTO: 4.45 M/UL (ref 4.7–6.1)
SODIUM SERPL-SCNC: 133 MMOL/L (ref 135–145)
T4 FREE SERPL-MCNC: 0.74 NG/DL (ref 0.93–1.7)
TSH SERPL DL<=0.005 MIU/L-ACNC: 1.67 UIU/ML (ref 0.38–5.33)
WBC # BLD AUTO: 6.5 K/UL (ref 4.8–10.8)

## 2023-11-03 PROCEDURE — 84443 ASSAY THYROID STIM HORMONE: CPT

## 2023-11-03 PROCEDURE — 80053 COMPREHEN METABOLIC PANEL: CPT

## 2023-11-03 PROCEDURE — 82140 ASSAY OF AMMONIA: CPT

## 2023-11-03 PROCEDURE — 85610 PROTHROMBIN TIME: CPT

## 2023-11-03 PROCEDURE — 84439 ASSAY OF FREE THYROXINE: CPT

## 2023-11-03 PROCEDURE — 36415 COLL VENOUS BLD VENIPUNCTURE: CPT

## 2023-11-03 PROCEDURE — 85025 COMPLETE CBC W/AUTO DIFF WBC: CPT

## 2023-11-08 ENCOUNTER — OFFICE VISIT (OUTPATIENT)
Dept: MEDICAL GROUP | Facility: PHYSICIAN GROUP | Age: 53
End: 2023-11-08
Payer: COMMERCIAL

## 2023-11-08 VITALS
HEIGHT: 75 IN | TEMPERATURE: 98 F | OXYGEN SATURATION: 97 % | HEART RATE: 86 BPM | DIASTOLIC BLOOD PRESSURE: 78 MMHG | RESPIRATION RATE: 14 BRPM | SYSTOLIC BLOOD PRESSURE: 110 MMHG | WEIGHT: 239 LBS | BODY MASS INDEX: 29.72 KG/M2

## 2023-11-08 DIAGNOSIS — E03.8 SUBCLINICAL HYPOTHYROIDISM: ICD-10-CM

## 2023-11-08 DIAGNOSIS — Z00.00 WELLNESS EXAMINATION: ICD-10-CM

## 2023-11-08 DIAGNOSIS — K70.31 ASCITES DUE TO ALCOHOLIC CIRRHOSIS (HCC): ICD-10-CM

## 2023-11-08 DIAGNOSIS — R73.9 BLOOD GLUCOSE ELEVATED: ICD-10-CM

## 2023-11-08 DIAGNOSIS — K74.69 DECOMPENSATED LIVER DISEASE (HCC): ICD-10-CM

## 2023-11-08 DIAGNOSIS — R53.83 OTHER FATIGUE: ICD-10-CM

## 2023-11-08 DIAGNOSIS — E55.9 VITAMIN D DEFICIENCY: ICD-10-CM

## 2023-11-08 DIAGNOSIS — Z23 NEED FOR VACCINATION: ICD-10-CM

## 2023-11-08 DIAGNOSIS — F17.200 TOBACCO DEPENDENCE: ICD-10-CM

## 2023-11-08 DIAGNOSIS — Z12.2 SCREENING FOR LUNG CANCER: ICD-10-CM

## 2023-11-08 DIAGNOSIS — K70.31 ALCOHOLIC CIRRHOSIS OF LIVER WITH ASCITES (HCC): ICD-10-CM

## 2023-11-08 PROBLEM — D69.6 THROMBOCYTOPENIA, UNSPECIFIED (HCC): Status: ACTIVE | Noted: 2023-11-08

## 2023-11-08 PROBLEM — G47.00 INSOMNIA: Status: ACTIVE | Noted: 2023-11-08

## 2023-11-08 PROBLEM — R10.84 ABDOMINAL PAIN, GENERALIZED: Status: ACTIVE | Noted: 2023-11-08

## 2023-11-08 PROCEDURE — 90471 IMMUNIZATION ADMIN: CPT | Performed by: STUDENT IN AN ORGANIZED HEALTH CARE EDUCATION/TRAINING PROGRAM

## 2023-11-08 PROCEDURE — 90677 PCV20 VACCINE IM: CPT | Performed by: STUDENT IN AN ORGANIZED HEALTH CARE EDUCATION/TRAINING PROGRAM

## 2023-11-08 PROCEDURE — 3078F DIAST BP <80 MM HG: CPT | Performed by: STUDENT IN AN ORGANIZED HEALTH CARE EDUCATION/TRAINING PROGRAM

## 2023-11-08 PROCEDURE — 90472 IMMUNIZATION ADMIN EACH ADD: CPT | Performed by: STUDENT IN AN ORGANIZED HEALTH CARE EDUCATION/TRAINING PROGRAM

## 2023-11-08 PROCEDURE — 99396 PREV VISIT EST AGE 40-64: CPT | Mod: 25 | Performed by: STUDENT IN AN ORGANIZED HEALTH CARE EDUCATION/TRAINING PROGRAM

## 2023-11-08 PROCEDURE — 3074F SYST BP LT 130 MM HG: CPT | Performed by: STUDENT IN AN ORGANIZED HEALTH CARE EDUCATION/TRAINING PROGRAM

## 2023-11-08 PROCEDURE — 90746 HEPB VACCINE 3 DOSE ADULT IM: CPT | Performed by: STUDENT IN AN ORGANIZED HEALTH CARE EDUCATION/TRAINING PROGRAM

## 2023-11-08 ASSESSMENT — FIBROSIS 4 INDEX: FIB4 SCORE: 2.181818181818181818

## 2024-01-08 ENCOUNTER — OFFICE VISIT (OUTPATIENT)
Dept: MEDICAL GROUP | Facility: PHYSICIAN GROUP | Age: 54
End: 2024-01-08
Payer: COMMERCIAL

## 2024-01-08 VITALS
HEIGHT: 75 IN | DIASTOLIC BLOOD PRESSURE: 54 MMHG | OXYGEN SATURATION: 97 % | WEIGHT: 244 LBS | TEMPERATURE: 99.1 F | SYSTOLIC BLOOD PRESSURE: 100 MMHG | HEART RATE: 87 BPM | BODY MASS INDEX: 30.34 KG/M2

## 2024-01-08 DIAGNOSIS — Z23 NEED FOR VACCINATION: ICD-10-CM

## 2024-01-08 DIAGNOSIS — K70.31 ALCOHOLIC CIRRHOSIS OF LIVER WITH ASCITES (HCC): ICD-10-CM

## 2024-01-08 DIAGNOSIS — R53.83 OTHER FATIGUE: ICD-10-CM

## 2024-01-08 DIAGNOSIS — E55.9 VITAMIN D DEFICIENCY: ICD-10-CM

## 2024-01-08 PROCEDURE — 90746 HEPB VACCINE 3 DOSE ADULT IM: CPT | Performed by: STUDENT IN AN ORGANIZED HEALTH CARE EDUCATION/TRAINING PROGRAM

## 2024-01-08 PROCEDURE — 99214 OFFICE O/P EST MOD 30 MIN: CPT | Mod: 25 | Performed by: STUDENT IN AN ORGANIZED HEALTH CARE EDUCATION/TRAINING PROGRAM

## 2024-01-08 PROCEDURE — 90471 IMMUNIZATION ADMIN: CPT | Performed by: STUDENT IN AN ORGANIZED HEALTH CARE EDUCATION/TRAINING PROGRAM

## 2024-01-08 PROCEDURE — 3078F DIAST BP <80 MM HG: CPT | Performed by: STUDENT IN AN ORGANIZED HEALTH CARE EDUCATION/TRAINING PROGRAM

## 2024-01-08 PROCEDURE — 3074F SYST BP LT 130 MM HG: CPT | Performed by: STUDENT IN AN ORGANIZED HEALTH CARE EDUCATION/TRAINING PROGRAM

## 2024-01-08 ASSESSMENT — ENCOUNTER SYMPTOMS
FEVER: 0
PALPITATIONS: 0
CHILLS: 0
SHORTNESS OF BREATH: 0

## 2024-01-08 ASSESSMENT — FIBROSIS 4 INDEX: FIB4 SCORE: 2.181818181818181818

## 2024-01-08 ASSESSMENT — PATIENT HEALTH QUESTIONNAIRE - PHQ9: CLINICAL INTERPRETATION OF PHQ2 SCORE: 0

## 2024-01-08 NOTE — PROGRESS NOTES
"Subjective:     CC: Follow-up    HPI:   Mr. Horne presents today for follow-up visit.    He states that he was unable to get his labs completed but will get them completed this week.    He reports that his energy levels remain low but have improved from a year ago. He reports that he has started taking B12 supplements.    He states that he is scheduled to see GI on 2/26/2024.      Patient Active Problem List    Diagnosis Date Noted    Thrombocytopenia, unspecified (HCC) 11/08/2023    Insomnia 11/08/2023    Abdominal pain, generalized 11/08/2023    Liver cirrhosis (HCC) 10/04/2022    Decompensated liver disease (HCC) 10/03/2022    Ascites due to alcoholic cirrhosis (HCC) 10/03/2022       Health Maintenance: Completed  -Received second Hep B vaccine today  -Shingrix and Covid booster at the pharmacy    ROS:  Review of Systems   Constitutional:  Negative for chills and fever.   Respiratory:  Negative for shortness of breath.    Cardiovascular:  Negative for chest pain and palpitations.       Objective:     Exam:  /54 (BP Location: Left arm, Patient Position: Sitting, BP Cuff Size: Adult)   Pulse 87   Temp 37.3 °C (99.1 °F) (Temporal)   Ht 1.905 m (6' 3\")   Wt 111 kg (244 lb)   SpO2 97%   BMI 30.50 kg/m²  Body mass index is 30.5 kg/m².    Physical Exam  Constitutional:       Appearance: Normal appearance.   Cardiovascular:      Rate and Rhythm: Normal rate and regular rhythm.   Pulmonary:      Effort: Pulmonary effort is normal. No respiratory distress.      Breath sounds: Normal breath sounds. No stridor. No wheezing or rhonchi.   Neurological:      Mental Status: He is alert.               Assessment & Plan:     53 y.o. male with the following -     1. Other fatigue  Chronic, ongoing condition. Will check testosterone levels.  - Testosterone, Free & Total, Adult Male (w/SHBG); Future    2. Alcoholic cirrhosis of liver with ascites (HCC)  Chronic, stable condition. Patient to f/u with GI on 2/26/24. " Continue furosemide 40 mg twice daily and spironolactone 100 mg twice daily    3. Need for vaccination  - Hep B Adult 20+    4. Vitamin D deficiency  - VITAMIN D,25 HYDROXY (DEFICIENCY); Future        Return in about 3 months (around 4/8/2024) for Chronic Conditions.    Please note that this dictation was created using voice recognition software. I have made every reasonable attempt to correct obvious errors, but I expect that there are errors of grammar and possibly content that I did not discover before finalizing the note.

## 2024-02-14 ENCOUNTER — HOSPITAL ENCOUNTER (OUTPATIENT)
Dept: LAB | Facility: MEDICAL CENTER | Age: 54
End: 2024-02-14
Attending: STUDENT IN AN ORGANIZED HEALTH CARE EDUCATION/TRAINING PROGRAM
Payer: COMMERCIAL

## 2024-02-14 DIAGNOSIS — E03.8 SUBCLINICAL HYPOTHYROIDISM: ICD-10-CM

## 2024-02-14 DIAGNOSIS — R73.9 BLOOD GLUCOSE ELEVATED: ICD-10-CM

## 2024-02-14 DIAGNOSIS — Z00.00 WELLNESS EXAMINATION: ICD-10-CM

## 2024-02-14 DIAGNOSIS — R53.83 OTHER FATIGUE: ICD-10-CM

## 2024-02-14 DIAGNOSIS — E55.9 VITAMIN D DEFICIENCY: ICD-10-CM

## 2024-02-14 LAB
25(OH)D3 SERPL-MCNC: 33 NG/ML (ref 30–100)
CHOLEST SERPL-MCNC: 151 MG/DL (ref 100–199)
EST. AVERAGE GLUCOSE BLD GHB EST-MCNC: 206 MG/DL
FOLATE SERPL-MCNC: 3.4 NG/ML
HBA1C MFR BLD: 8.8 % (ref 4–5.6)
HDLC SERPL-MCNC: 31 MG/DL
HIV 1+2 AB+HIV1 P24 AG SERPL QL IA: NORMAL
LDLC SERPL CALC-MCNC: 91 MG/DL
T3 SERPL-MCNC: 167 NG/DL (ref 60–181)
TRIGL SERPL-MCNC: 146 MG/DL (ref 0–149)
TSH SERPL DL<=0.005 MIU/L-ACNC: 1.99 UIU/ML (ref 0.38–5.33)
VIT B12 SERPL-MCNC: 1171 PG/ML (ref 211–911)

## 2024-02-14 PROCEDURE — 80061 LIPID PANEL: CPT

## 2024-02-14 PROCEDURE — 87389 HIV-1 AG W/HIV-1&-2 AB AG IA: CPT

## 2024-02-14 PROCEDURE — 82306 VITAMIN D 25 HYDROXY: CPT

## 2024-02-14 PROCEDURE — 84443 ASSAY THYROID STIM HORMONE: CPT

## 2024-02-14 PROCEDURE — 84270 ASSAY OF SEX HORMONE GLOBUL: CPT

## 2024-02-14 PROCEDURE — 83036 HEMOGLOBIN GLYCOSYLATED A1C: CPT

## 2024-02-14 PROCEDURE — 84480 ASSAY TRIIODOTHYRONINE (T3): CPT

## 2024-02-14 PROCEDURE — 82746 ASSAY OF FOLIC ACID SERUM: CPT

## 2024-02-14 PROCEDURE — 84402 ASSAY OF FREE TESTOSTERONE: CPT

## 2024-02-14 PROCEDURE — 36415 COLL VENOUS BLD VENIPUNCTURE: CPT

## 2024-02-14 PROCEDURE — 84403 ASSAY OF TOTAL TESTOSTERONE: CPT

## 2024-02-14 PROCEDURE — 82607 VITAMIN B-12: CPT

## 2024-02-16 LAB
SHBG SERPL-SCNC: 123 NMOL/L (ref 19–76)
TESTOST FREE MFR SERPL: 0.8 % (ref 1.6–2.9)
TESTOST FREE SERPL-MCNC: 97 PG/ML (ref 47–244)
TESTOST SERPL-MCNC: 1163 NG/DL (ref 300–890)

## 2024-04-08 ENCOUNTER — OFFICE VISIT (OUTPATIENT)
Dept: MEDICAL GROUP | Facility: PHYSICIAN GROUP | Age: 54
End: 2024-04-08
Payer: COMMERCIAL

## 2024-04-08 VITALS
BODY MASS INDEX: 30.84 KG/M2 | DIASTOLIC BLOOD PRESSURE: 80 MMHG | WEIGHT: 248 LBS | TEMPERATURE: 98 F | SYSTOLIC BLOOD PRESSURE: 122 MMHG | HEART RATE: 77 BPM | HEIGHT: 75 IN | OXYGEN SATURATION: 95 %

## 2024-04-08 DIAGNOSIS — E11.9 TYPE 2 DIABETES MELLITUS WITHOUT COMPLICATION, WITHOUT LONG-TERM CURRENT USE OF INSULIN (HCC): ICD-10-CM

## 2024-04-08 DIAGNOSIS — R79.89 ELEVATED TESTOSTERONE LEVEL IN MALE: ICD-10-CM

## 2024-04-08 DIAGNOSIS — K70.31 ALCOHOLIC CIRRHOSIS OF LIVER WITH ASCITES (HCC): ICD-10-CM

## 2024-04-08 DIAGNOSIS — E66.9 CLASS 1 OBESITY WITHOUT SERIOUS COMORBIDITY WITH BODY MASS INDEX (BMI) OF 31.0 TO 31.9 IN ADULT, UNSPECIFIED OBESITY TYPE: ICD-10-CM

## 2024-04-08 PROBLEM — K72.90 DECOMPENSATED LIVER DISEASE (HCC): Status: RESOLVED | Noted: 2022-10-03 | Resolved: 2024-04-08

## 2024-04-08 PROBLEM — K74.60 DECOMPENSATED LIVER DISEASE (HCC): Status: RESOLVED | Noted: 2022-10-03 | Resolved: 2024-04-08

## 2024-04-08 PROBLEM — K74.69 DECOMPENSATED LIVER DISEASE (HCC): Status: RESOLVED | Noted: 2022-10-03 | Resolved: 2024-04-08

## 2024-04-08 LAB
HBA1C MFR BLD: 8.5 % (ref ?–5.8)
POCT INT CON NEG: NEGATIVE
POCT INT CON POS: POSITIVE

## 2024-04-08 PROCEDURE — 83036 HEMOGLOBIN GLYCOSYLATED A1C: CPT | Performed by: STUDENT IN AN ORGANIZED HEALTH CARE EDUCATION/TRAINING PROGRAM

## 2024-04-08 PROCEDURE — 99214 OFFICE O/P EST MOD 30 MIN: CPT | Performed by: STUDENT IN AN ORGANIZED HEALTH CARE EDUCATION/TRAINING PROGRAM

## 2024-04-08 PROCEDURE — 3074F SYST BP LT 130 MM HG: CPT | Performed by: STUDENT IN AN ORGANIZED HEALTH CARE EDUCATION/TRAINING PROGRAM

## 2024-04-08 PROCEDURE — 3079F DIAST BP 80-89 MM HG: CPT | Performed by: STUDENT IN AN ORGANIZED HEALTH CARE EDUCATION/TRAINING PROGRAM

## 2024-04-08 ASSESSMENT — ENCOUNTER SYMPTOMS
CHILLS: 0
SHORTNESS OF BREATH: 0
PALPITATIONS: 0
FEVER: 0

## 2024-04-08 ASSESSMENT — FIBROSIS 4 INDEX: FIB4 SCORE: 2.181818181818181818

## 2024-04-08 NOTE — PROGRESS NOTES
"Subjective:     CC:  Follow-Up on Labs    HPI:   Mr. Billings is a pleasant 53-year-old who presents today to follow-up on lab work.    He reports that he continues to feel fatigued in the morning.  He states that he did not receive my [employer] message regarding his lab results and he never received a message from the [facility] to schedule him earlier.    Patient reports that since he stopped drinking alcohol he has been drinking soda.  He reports that he snacks throughout the day and has jellybeans.    Patient reports that he saw his gastroenterologist and he no longer has to follow-up with them on regular basis.    Patient Active Problem List    Diagnosis Date Noted    Type 2 diabetes mellitus without complication, without long-term current use of insulin (HCC) 04/08/2024    Thrombocytopenia, unspecified (HCC) 11/08/2023    Insomnia 11/08/2023    Abdominal pain, generalized 11/08/2023    Liver cirrhosis (HCC) 10/04/2022    Ascites due to alcoholic cirrhosis (HCC) 10/03/2022       Health Maintenance: Completed    ROS:  Review of Systems   Constitutional:  Negative for chills and fever.   Respiratory:  Negative for shortness of breath.    Cardiovascular:  Negative for chest pain and palpitations.       Objective:     Exam:  /80 (BP Location: Right arm, Patient Position: Sitting, BP Cuff Size: Adult)   Pulse 77   Temp 36.7 °C (98 °F) (Temporal)   Ht 1.905 m (6' 3\")   Wt 112 kg (248 lb)   SpO2 95%   BMI 31.00 kg/m²  Body mass index is 31 kg/m².    Physical Exam  Constitutional:       Appearance: Normal appearance.   Cardiovascular:      Rate and Rhythm: Normal rate and regular rhythm.      Pulses:           Dorsalis pedis pulses are 2+ on the right side and 2+ on the left side.   Pulmonary:      Effort: Pulmonary effort is normal. No respiratory distress.      Breath sounds: Normal breath sounds. No stridor. No wheezing or rhonchi.   Musculoskeletal:      Right foot: Normal range of motion. No deformity or " bunion.      Left foot: Normal range of motion. No deformity or bunion.   Feet:      Right foot:      Protective Sensation: 5 sites tested.  5 sites sensed.      Skin integrity: Skin integrity normal. No ulcer, blister or skin breakdown.      Toenail Condition: Right toenails are normal.      Left foot:      Protective Sensation: 5 sites tested.  5 sites sensed.      Skin integrity: Skin integrity normal. No ulcer, blister or skin breakdown.      Toenail Condition: Left toenails are normal.   Neurological:      Mental Status: He is alert.             Labs:    Latest Reference Range & Units 02/14/24 07:42   Glycohemoglobin 4.0 - 5.6 % 8.8 (H)   Estim. Avg Glu mg/dL 206   Cholesterol,Tot 100 - 199 mg/dL 151   Triglycerides 0 - 149 mg/dL 146   HDL >=40 mg/dL 31 !   LDL <100 mg/dL 91   25-Hydroxy   Vitamin D 25 30 - 100 ng/mL 33   Folate -Folic Acid >4.0 ng/mL 3.4 !   Vitamin B12 -True Cobalamin 211 - 911 pg/mL 1171 (H)   TSH 0.380 - 5.330 uIU/mL 1.990   T3 60.0 - 181.0 ng/dL 167.0   HIV Ag/Ab Combo Assay Non Reactive  Non-Reactive   Free Testosterone 47 - 244 pg/mL 97   Sex Hormone Bind Globulin 19 - 76 nmol/L 123 (H)   Testosterone % Free 1.6 - 2.9 % 0.8 (L)   Testosterone,Total 300 - 890 ng/dL 1163 (H)   (H): Data is abnormally high  !: Data is abnormal  (L): Data is abnormally low     Latest Reference Range & Units 04/08/24 09:27   Glycohemoglobin 5.8 % 8.5 !   !: Data is abnormal    Assessment & Plan:     53 y.o. male with the following -     1. Type 2 diabetes mellitus without complication, without long-term current use of insulin (HCC)  Chronic, uncontrolled.  A1c elevated at 8.5%.  Monofilament exam completed with normal findings.  Urine microalbumin ordered.  Complete retinal screening at the next visit.  After shared decision making we will start patient on Jardiance 10 mg daily.  Side effects of the medication were discussed in detail with the patient.  - POCT Hemoglobin A1C  - MICROALBUMIN CREAT RATIO URINE;  Future  - Empagliflozin (JARDIANCE) 10 MG Tab tablet; Take 1 Tablet by mouth every day.  Dispense: 90 Tablet; Refill: 3    2. Alcoholic cirrhosis of liver with ascites (HCC)  Chronic, stable.  Patient established with gastroenterology.  Per patient his gastroenterologist at his hepatic function has returned to baseline and he does not need frequent follow-ups.    3. Elevated testosterone level in male  Testosterone levels from 2/14 returned elevated.  Per chart review labs were completed around 7:45.  Will have patient repeat Fostril level.  - Testosterone, Free & Total, Adult Male (w/SHBG); Future    4. Class 1 obesity without serious comorbidity with body mass index (BMI) of 31.0 to 31.9 in adult, unspecified obesity type  Chronic, stable condition.  BMI of 31.  Patient was advised to engage in at least 30 minutes of physical activity daily.  Discussed in detail regarding diet changes.          Return in about 3 months (around 7/8/2024) for DM, Chronic Conditions.    Please note that this dictation was created using voice recognition software. I have made every reasonable attempt to correct obvious errors, but I expect that there are errors of grammar and possibly content that I did not discover before finalizing the note.

## 2024-06-21 ENCOUNTER — HOSPITAL ENCOUNTER (OUTPATIENT)
Dept: RADIOLOGY | Facility: MEDICAL CENTER | Age: 54
End: 2024-06-21
Attending: PHYSICIAN ASSISTANT
Payer: COMMERCIAL

## 2024-06-21 DIAGNOSIS — K74.60 HEPATIC CIRRHOSIS, UNSPECIFIED HEPATIC CIRRHOSIS TYPE, UNSPECIFIED WHETHER ASCITES PRESENT (HCC): ICD-10-CM

## 2024-06-21 DIAGNOSIS — R61 NIGHT SWEATS: ICD-10-CM

## 2024-06-21 PROCEDURE — 76705 ECHO EXAM OF ABDOMEN: CPT

## 2024-07-10 ENCOUNTER — OFFICE VISIT (OUTPATIENT)
Dept: MEDICAL GROUP | Facility: PHYSICIAN GROUP | Age: 54
End: 2024-07-10
Payer: COMMERCIAL

## 2024-07-10 VITALS
DIASTOLIC BLOOD PRESSURE: 70 MMHG | HEIGHT: 75 IN | HEART RATE: 73 BPM | TEMPERATURE: 97.5 F | SYSTOLIC BLOOD PRESSURE: 122 MMHG | BODY MASS INDEX: 31.08 KG/M2 | OXYGEN SATURATION: 96 % | WEIGHT: 250 LBS

## 2024-07-10 DIAGNOSIS — K70.31 ALCOHOLIC CIRRHOSIS OF LIVER WITH ASCITES (HCC): ICD-10-CM

## 2024-07-10 DIAGNOSIS — R79.89 ELEVATED TESTOSTERONE LEVEL IN MALE: ICD-10-CM

## 2024-07-10 DIAGNOSIS — E11.9 TYPE 2 DIABETES MELLITUS WITHOUT COMPLICATION, WITHOUT LONG-TERM CURRENT USE OF INSULIN (HCC): ICD-10-CM

## 2024-07-10 DIAGNOSIS — K80.20 CALCULUS OF GALLBLADDER WITHOUT CHOLECYSTITIS WITHOUT OBSTRUCTION: ICD-10-CM

## 2024-07-10 LAB
HBA1C MFR BLD: 8.2 % (ref ?–5.8)
POCT INT CON NEG: NEGATIVE
POCT INT CON POS: POSITIVE

## 2024-07-10 PROCEDURE — 3078F DIAST BP <80 MM HG: CPT | Performed by: STUDENT IN AN ORGANIZED HEALTH CARE EDUCATION/TRAINING PROGRAM

## 2024-07-10 PROCEDURE — 3074F SYST BP LT 130 MM HG: CPT | Performed by: STUDENT IN AN ORGANIZED HEALTH CARE EDUCATION/TRAINING PROGRAM

## 2024-07-10 PROCEDURE — 83036 HEMOGLOBIN GLYCOSYLATED A1C: CPT | Performed by: STUDENT IN AN ORGANIZED HEALTH CARE EDUCATION/TRAINING PROGRAM

## 2024-07-10 PROCEDURE — 99214 OFFICE O/P EST MOD 30 MIN: CPT | Performed by: STUDENT IN AN ORGANIZED HEALTH CARE EDUCATION/TRAINING PROGRAM

## 2024-07-10 RX ORDER — FUROSEMIDE 40 MG/1
40 TABLET ORAL DAILY
Qty: 30 TABLET | Refills: 0 | Status: SHIPPED | OUTPATIENT
Start: 2024-07-10

## 2024-07-10 RX ORDER — GLIPIZIDE 2.5 MG/1
2.5 TABLET, EXTENDED RELEASE ORAL DAILY
Qty: 90 TABLET | Refills: 0 | Status: SHIPPED | OUTPATIENT
Start: 2024-07-10 | End: 2024-07-10

## 2024-07-10 RX ORDER — SPIRONOLACTONE 100 MG/1
100 TABLET, FILM COATED ORAL DAILY
Qty: 30 TABLET | Refills: 0 | Status: SHIPPED | OUTPATIENT
Start: 2024-07-10

## 2024-07-10 ASSESSMENT — ENCOUNTER SYMPTOMS
FEVER: 0
PALPITATIONS: 0
CHILLS: 0
SHORTNESS OF BREATH: 0

## 2024-07-10 ASSESSMENT — FIBROSIS 4 INDEX: FIB4 SCORE: 2.181818181818181818

## 2024-07-16 ENCOUNTER — TELEPHONE (OUTPATIENT)
Dept: HEALTH INFORMATION MANAGEMENT | Facility: OTHER | Age: 54
End: 2024-07-16

## 2024-08-15 ENCOUNTER — HOSPITAL ENCOUNTER (OUTPATIENT)
Dept: LAB | Facility: MEDICAL CENTER | Age: 54
End: 2024-08-15
Attending: NURSE PRACTITIONER
Payer: COMMERCIAL

## 2024-08-15 LAB
ALBUMIN SERPL BCP-MCNC: 4 G/DL (ref 3.2–4.9)
ALBUMIN/GLOB SERPL: 1.2 G/DL
ALP SERPL-CCNC: 93 U/L (ref 30–99)
ALT SERPL-CCNC: 13 U/L (ref 2–50)
ANION GAP SERPL CALC-SCNC: 10 MMOL/L (ref 7–16)
AST SERPL-CCNC: 19 U/L (ref 12–45)
BASOPHILS # BLD AUTO: 0.9 % (ref 0–1.8)
BASOPHILS # BLD: 0.07 K/UL (ref 0–0.12)
BILIRUB SERPL-MCNC: 0.4 MG/DL (ref 0.1–1.5)
BUN SERPL-MCNC: 13 MG/DL (ref 8–22)
CALCIUM ALBUM COR SERPL-MCNC: 9.3 MG/DL (ref 8.5–10.5)
CALCIUM SERPL-MCNC: 9.3 MG/DL (ref 8.5–10.5)
CHLORIDE SERPL-SCNC: 100 MMOL/L (ref 96–112)
CO2 SERPL-SCNC: 25 MMOL/L (ref 20–33)
CREAT SERPL-MCNC: 0.96 MG/DL (ref 0.5–1.4)
EOSINOPHIL # BLD AUTO: 0.24 K/UL (ref 0–0.51)
EOSINOPHIL NFR BLD: 3.2 % (ref 0–6.9)
ERYTHROCYTE [DISTWIDTH] IN BLOOD BY AUTOMATED COUNT: 46 FL (ref 35.9–50)
GFR SERPLBLD CREATININE-BSD FMLA CKD-EPI: 95 ML/MIN/1.73 M 2
GLOBULIN SER CALC-MCNC: 3.4 G/DL (ref 1.9–3.5)
GLUCOSE SERPL-MCNC: 346 MG/DL (ref 65–99)
HCT VFR BLD AUTO: 49.5 % (ref 42–52)
HGB BLD-MCNC: 16.6 G/DL (ref 14–18)
IMM GRANULOCYTES # BLD AUTO: 0.03 K/UL (ref 0–0.11)
IMM GRANULOCYTES NFR BLD AUTO: 0.4 % (ref 0–0.9)
LYMPHOCYTES # BLD AUTO: 1.83 K/UL (ref 1–4.8)
LYMPHOCYTES NFR BLD: 24.8 % (ref 22–41)
MCH RBC QN AUTO: 32.2 PG (ref 27–33)
MCHC RBC AUTO-ENTMCNC: 33.5 G/DL (ref 32.3–36.5)
MCV RBC AUTO: 96.1 FL (ref 81.4–97.8)
MONOCYTES # BLD AUTO: 0.49 K/UL (ref 0–0.85)
MONOCYTES NFR BLD AUTO: 6.6 % (ref 0–13.4)
NEUTROPHILS # BLD AUTO: 4.73 K/UL (ref 1.82–7.42)
NEUTROPHILS NFR BLD: 64.1 % (ref 44–72)
NRBC # BLD AUTO: 0 K/UL
NRBC BLD-RTO: 0 /100 WBC (ref 0–0.2)
PLATELET # BLD AUTO: 162 K/UL (ref 164–446)
PMV BLD AUTO: 10.9 FL (ref 9–12.9)
POTASSIUM SERPL-SCNC: 4.3 MMOL/L (ref 3.6–5.5)
PROT SERPL-MCNC: 7.4 G/DL (ref 6–8.2)
RBC # BLD AUTO: 5.15 M/UL (ref 4.7–6.1)
SODIUM SERPL-SCNC: 135 MMOL/L (ref 135–145)
WBC # BLD AUTO: 7.4 K/UL (ref 4.8–10.8)

## 2024-08-15 PROCEDURE — 85025 COMPLETE CBC W/AUTO DIFF WBC: CPT

## 2024-08-15 PROCEDURE — 36415 COLL VENOUS BLD VENIPUNCTURE: CPT

## 2024-08-15 PROCEDURE — 82105 ALPHA-FETOPROTEIN SERUM: CPT

## 2024-08-15 PROCEDURE — 80053 COMPREHEN METABOLIC PANEL: CPT

## 2024-08-19 LAB — AFP-TM SERPL-MCNC: 5 NG/ML (ref 0–9)

## 2024-10-11 ENCOUNTER — OFFICE VISIT (OUTPATIENT)
Dept: MEDICAL GROUP | Facility: PHYSICIAN GROUP | Age: 54
End: 2024-10-11
Payer: COMMERCIAL

## 2024-10-11 ENCOUNTER — HOSPITAL ENCOUNTER (OUTPATIENT)
Dept: LAB | Facility: MEDICAL CENTER | Age: 54
End: 2024-10-11
Attending: STUDENT IN AN ORGANIZED HEALTH CARE EDUCATION/TRAINING PROGRAM
Payer: COMMERCIAL

## 2024-10-11 VITALS
WEIGHT: 250 LBS | HEART RATE: 88 BPM | SYSTOLIC BLOOD PRESSURE: 102 MMHG | DIASTOLIC BLOOD PRESSURE: 72 MMHG | TEMPERATURE: 97.8 F | BODY MASS INDEX: 31.08 KG/M2 | HEIGHT: 75 IN | OXYGEN SATURATION: 95 %

## 2024-10-11 DIAGNOSIS — K70.31 ALCOHOLIC CIRRHOSIS OF LIVER WITH ASCITES (HCC): ICD-10-CM

## 2024-10-11 DIAGNOSIS — K76.6 PORTAL VENOUS HYPERTENSION (HCC): ICD-10-CM

## 2024-10-11 DIAGNOSIS — D69.6 THROMBOCYTOPENIA, UNSPECIFIED (HCC): ICD-10-CM

## 2024-10-11 DIAGNOSIS — E11.9 TYPE 2 DIABETES MELLITUS WITHOUT COMPLICATION, WITHOUT LONG-TERM CURRENT USE OF INSULIN (HCC): ICD-10-CM

## 2024-10-11 DIAGNOSIS — R79.89 ELEVATED TESTOSTERONE LEVEL IN MALE: ICD-10-CM

## 2024-10-11 LAB
CREAT UR-MCNC: 84.8 MG/DL
EST. AVERAGE GLUCOSE BLD GHB EST-MCNC: 312 MG/DL
HBA1C MFR BLD: 12.5 % (ref 4–5.6)
MICROALBUMIN UR-MCNC: <1.2 MG/DL
MICROALBUMIN/CREAT UR: NORMAL MG/G (ref 0–30)

## 2024-10-11 PROCEDURE — 3078F DIAST BP <80 MM HG: CPT | Performed by: STUDENT IN AN ORGANIZED HEALTH CARE EDUCATION/TRAINING PROGRAM

## 2024-10-11 PROCEDURE — 3074F SYST BP LT 130 MM HG: CPT | Performed by: STUDENT IN AN ORGANIZED HEALTH CARE EDUCATION/TRAINING PROGRAM

## 2024-10-11 PROCEDURE — 36415 COLL VENOUS BLD VENIPUNCTURE: CPT

## 2024-10-11 PROCEDURE — 84270 ASSAY OF SEX HORMONE GLOBUL: CPT

## 2024-10-11 PROCEDURE — 84402 ASSAY OF FREE TESTOSTERONE: CPT

## 2024-10-11 PROCEDURE — 84403 ASSAY OF TOTAL TESTOSTERONE: CPT

## 2024-10-11 PROCEDURE — 82043 UR ALBUMIN QUANTITATIVE: CPT

## 2024-10-11 PROCEDURE — 82570 ASSAY OF URINE CREATININE: CPT

## 2024-10-11 PROCEDURE — 99214 OFFICE O/P EST MOD 30 MIN: CPT | Performed by: STUDENT IN AN ORGANIZED HEALTH CARE EDUCATION/TRAINING PROGRAM

## 2024-10-11 PROCEDURE — 83036 HEMOGLOBIN GLYCOSYLATED A1C: CPT

## 2024-10-11 RX ORDER — GLIPIZIDE 5 MG/1
5 TABLET, FILM COATED, EXTENDED RELEASE ORAL DAILY
Qty: 90 TABLET | Refills: 0 | Status: SHIPPED | OUTPATIENT
Start: 2024-10-11

## 2024-10-11 ASSESSMENT — ENCOUNTER SYMPTOMS
SHORTNESS OF BREATH: 0
PALPITATIONS: 0
FEVER: 0
CHILLS: 0

## 2024-10-11 ASSESSMENT — FIBROSIS 4 INDEX: FIB4 SCORE: 1.69

## 2024-10-13 LAB
SHBG SERPL-SCNC: 75 NMOL/L (ref 19–76)
TESTOST FREE MFR SERPL: 1.2 % (ref 1.6–2.9)
TESTOST FREE SERPL-MCNC: 111 PG/ML (ref 47–244)
TESTOST SERPL-MCNC: 913 NG/DL (ref 300–890)

## 2024-10-14 DIAGNOSIS — E11.9 TYPE 2 DIABETES MELLITUS WITHOUT COMPLICATION, WITHOUT LONG-TERM CURRENT USE OF INSULIN (HCC): ICD-10-CM

## 2024-10-15 ENCOUNTER — PATIENT MESSAGE (OUTPATIENT)
Dept: MEDICAL GROUP | Facility: PHYSICIAN GROUP | Age: 54
End: 2024-10-15
Payer: COMMERCIAL

## 2025-01-09 ENCOUNTER — OFFICE VISIT (OUTPATIENT)
Dept: MEDICAL GROUP | Facility: PHYSICIAN GROUP | Age: 55
End: 2025-01-09
Payer: COMMERCIAL

## 2025-01-09 VITALS
BODY MASS INDEX: 31.08 KG/M2 | HEART RATE: 83 BPM | TEMPERATURE: 96.9 F | WEIGHT: 250 LBS | OXYGEN SATURATION: 95 % | HEIGHT: 75 IN

## 2025-01-09 DIAGNOSIS — K76.6 PORTAL VENOUS HYPERTENSION (HCC): ICD-10-CM

## 2025-01-09 DIAGNOSIS — Z91.199 NONCOMPLIANCE: ICD-10-CM

## 2025-01-09 DIAGNOSIS — E11.9 TYPE 2 DIABETES MELLITUS WITHOUT COMPLICATION, WITHOUT LONG-TERM CURRENT USE OF INSULIN (HCC): ICD-10-CM

## 2025-01-09 DIAGNOSIS — R18.8 CIRRHOSIS OF LIVER WITH ASCITES, UNSPECIFIED HEPATIC CIRRHOSIS TYPE (HCC): ICD-10-CM

## 2025-01-09 DIAGNOSIS — K74.60 CIRRHOSIS OF LIVER WITH ASCITES, UNSPECIFIED HEPATIC CIRRHOSIS TYPE (HCC): ICD-10-CM

## 2025-01-09 LAB
HBA1C MFR BLD: 14.4 % (ref ?–5.8)
POCT INT CON NEG: NEGATIVE
POCT INT CON POS: POSITIVE

## 2025-01-09 PROCEDURE — 83036 HEMOGLOBIN GLYCOSYLATED A1C: CPT | Performed by: STUDENT IN AN ORGANIZED HEALTH CARE EDUCATION/TRAINING PROGRAM

## 2025-01-09 PROCEDURE — 99214 OFFICE O/P EST MOD 30 MIN: CPT | Performed by: STUDENT IN AN ORGANIZED HEALTH CARE EDUCATION/TRAINING PROGRAM

## 2025-01-09 RX ORDER — GLIPIZIDE 5 MG/1
5 TABLET, FILM COATED, EXTENDED RELEASE ORAL DAILY
Qty: 90 TABLET | Refills: 0 | Status: SHIPPED | OUTPATIENT
Start: 2025-01-09

## 2025-01-09 RX ORDER — GLIPIZIDE 5 MG/1
5 TABLET, FILM COATED, EXTENDED RELEASE ORAL DAILY
Qty: 90 TABLET | Refills: 0 | Status: SHIPPED | OUTPATIENT
Start: 2025-01-09 | End: 2025-01-09 | Stop reason: SDUPTHER

## 2025-01-09 ASSESSMENT — FIBROSIS 4 INDEX: FIB4 SCORE: 1.72

## 2025-01-09 ASSESSMENT — PATIENT HEALTH QUESTIONNAIRE - PHQ9: CLINICAL INTERPRETATION OF PHQ2 SCORE: 0

## 2025-01-09 ASSESSMENT — ENCOUNTER SYMPTOMS
CHILLS: 0
PALPITATIONS: 0
SHORTNESS OF BREATH: 0
FEVER: 0

## 2025-01-09 NOTE — PROGRESS NOTES
Subjective:   Verbal consent was acquired by the patient to use [employer] ambient listening note generation during this visit Yes     CC: Follow-up on diabetes    History of Present Illness  Mr. Billings is a pleasant 53 yo who presents today to follow-up on diabetes.     He reports experiencing abdominal discomfort today, which he attributes to anxiety related to this visit. He has been under the care of a gastroenterologist and underwent an endoscopic procedure, the results of which were not communicated to him. He has been free from severe abdominal pain for several months until today. He also reports nausea but no episodes of vomiting. His dietary intake yesterday was consistent with his usual diet, including a hamburger and a small portion of sausage. He has not consumed any food today. His current medication regimen includes Bentyl for abdominal cramping, furosemide, spironolactone for ascites, and omeprazole for acid reflux.    He has not been adhering to his prescribed glipizide regimen for diabetes management but expresses readiness to resume this medication. He inquires about the possibility of taking glipizide twice daily and whether it can be administered prior to dinner. He also seeks information on the potential side effects of glipizide. He does not experience any neuropathic pain in his lower extremities.    MEDICATIONS  Bentyl, furosemide, spironolactone, omeprazole, glipizide (noncompliant).    Patient Active Problem List    Diagnosis Date Noted    Noncompliance 01/09/2025    Portal venous hypertension (HCC) 10/11/2024    Calculus of gallbladder without cholecystitis without obstruction 07/10/2024    Type 2 diabetes mellitus without complication, without long-term current use of insulin (HCC) 04/08/2024    Thrombocytopenia, unspecified (HCC) 11/08/2023    Insomnia 11/08/2023    Abdominal pain, generalized 11/08/2023    Liver cirrhosis (HCC) 10/04/2022       Health Maintenance:  "Completed    ROS:  Review of Systems   Constitutional:  Negative for chills and fever.   Respiratory:  Negative for shortness of breath.    Cardiovascular:  Negative for chest pain and palpitations.       Objective:     Exam:  Pulse 83   Temp 36.1 °C (96.9 °F) (Temporal)   Ht 1.905 m (6' 3\")   Wt 113 kg (250 lb)   SpO2 95%   BMI 31.25 kg/m²  Body mass index is 31.25 kg/m².    Physical Exam  Constitutional:       Appearance: Normal appearance.   Cardiovascular:      Rate and Rhythm: Normal rate and regular rhythm.   Pulmonary:      Effort: Pulmonary effort is normal. No respiratory distress.      Breath sounds: Normal breath sounds. No stridor. No wheezing or rhonchi.   Neurological:      Mental Status: He is alert.             Labs:   Lab Results   Component Value Date/Time    HBA1C 14.4 (A) 01/09/2025 08:59 AM          Assessment & Plan:     54 y.o. male with the following -     1. Type 2 diabetes mellitus without complication, without long-term current use of insulin ([facility])  Chronic, uncontrolled. A1c has increased from 12% to 14.4%.  Patient was prescribed glipizide 5 mg at the last visit however patient has not started the medication.  Patient previously declined metformin due to side effects of abdominal pain.  Patient has also tried Jardiance 25 mg in the past however this was also discontinued due to side effects of abdominal pain.  I explained to the patient that his A1c is uncontrolled and that he needs to be on medication to help lower this.  I explained to the patient that uncontrolled diabetes can lead to heart attacks and strokes and multiple other complications.  Patient stated that he will start taking his medication as prescribed.  I have previously also referred patient to endocrinology however patient does not want to be seen by them.  - POCT  A1C  - glipiZIDE ER (GLUCOTROL XL) 5 MG TABLET SR 24 HR; Take 1 Tablet by mouth every day.  Dispense: 90 Tablet; Refill: 0    2. Noncompliance  Patient " is noncompliant with medication regimen.  Patient has uncontrolled diabetes.  Patient previously declined taking metformin due to side effects and stopped taking Jardiance due to side effects.  At the last visit patient was prescribed glipizide 5 mg daily however patient has not started taking the medication.  I have referred patient to endocrinology however patient has not scheduled an appointment.  Patient was notified of the endocrinology referral through [employer] and through postal mail.  Today I explained the importance of taking his medication to prevent complications due to type 2 diabetes.  Patient verbalized understanding and stated that he will start taking his medication and will continue to work on diet and exercise.    3. Cirrhosis of liver with ascites, unspecified hepatic cirrhosis type (HCC)  4. Portal venous hypertension (HCC)  Chronic, stable.  Patient is established with gastroenterology.  Patient he recently had an endoscopy which yielded normal results.  Continue furosemide 40 mg daily, spironolactone 100 mg daily, and Bentyl 10 mg daily.            Return in about 3 months (around 4/9/2025), or if symptoms worsen or fail to improve, for Chronic Conditions.    Please note that this dictation was created using voice recognition software. I have made every reasonable attempt to correct obvious errors, but I expect that there are errors of grammar and possibly content that I did not discover before finalizing the note.

## 2025-03-07 ENCOUNTER — HOSPITAL ENCOUNTER (OUTPATIENT)
Dept: LAB | Facility: MEDICAL CENTER | Age: 55
End: 2025-03-07
Attending: NURSE PRACTITIONER
Payer: COMMERCIAL

## 2025-03-07 LAB
ALBUMIN SERPL BCP-MCNC: 4.3 G/DL (ref 3.2–4.9)
ALBUMIN/GLOB SERPL: 1.1 G/DL
ALP SERPL-CCNC: 89 U/L (ref 30–99)
ALT SERPL-CCNC: 16 U/L (ref 2–50)
ANION GAP SERPL CALC-SCNC: 10 MMOL/L (ref 7–16)
AST SERPL-CCNC: 27 U/L (ref 12–45)
BASOPHILS # BLD AUTO: 0.9 % (ref 0–1.8)
BASOPHILS # BLD: 0.08 K/UL (ref 0–0.12)
BILIRUB SERPL-MCNC: 1 MG/DL (ref 0.1–1.5)
BUN SERPL-MCNC: 13 MG/DL (ref 8–22)
CALCIUM ALBUM COR SERPL-MCNC: 9.8 MG/DL (ref 8.5–10.5)
CALCIUM SERPL-MCNC: 10 MG/DL (ref 8.5–10.5)
CHLORIDE SERPL-SCNC: 99 MMOL/L (ref 96–112)
CO2 SERPL-SCNC: 26 MMOL/L (ref 20–33)
CREAT SERPL-MCNC: 1.12 MG/DL (ref 0.5–1.4)
EOSINOPHIL # BLD AUTO: 0.16 K/UL (ref 0–0.51)
EOSINOPHIL NFR BLD: 1.7 % (ref 0–6.9)
ERYTHROCYTE [DISTWIDTH] IN BLOOD BY AUTOMATED COUNT: 43.7 FL (ref 35.9–50)
GFR SERPLBLD CREATININE-BSD FMLA CKD-EPI: 78 ML/MIN/1.73 M 2
GLOBULIN SER CALC-MCNC: 3.9 G/DL (ref 1.9–3.5)
GLUCOSE SERPL-MCNC: 286 MG/DL (ref 65–99)
HCT VFR BLD AUTO: 52 % (ref 42–52)
HGB BLD-MCNC: 18.1 G/DL (ref 14–18)
IMM GRANULOCYTES # BLD AUTO: 0.04 K/UL (ref 0–0.11)
IMM GRANULOCYTES NFR BLD AUTO: 0.4 % (ref 0–0.9)
LYMPHOCYTES # BLD AUTO: 1.89 K/UL (ref 1–4.8)
LYMPHOCYTES NFR BLD: 20.1 % (ref 22–41)
MCH RBC QN AUTO: 33.1 PG (ref 27–33)
MCHC RBC AUTO-ENTMCNC: 34.8 G/DL (ref 32.3–36.5)
MCV RBC AUTO: 95.1 FL (ref 81.4–97.8)
MONOCYTES # BLD AUTO: 0.54 K/UL (ref 0–0.85)
MONOCYTES NFR BLD AUTO: 5.8 % (ref 0–13.4)
NEUTROPHILS # BLD AUTO: 6.67 K/UL (ref 1.82–7.42)
NEUTROPHILS NFR BLD: 71.1 % (ref 44–72)
NRBC # BLD AUTO: 0 K/UL
NRBC BLD-RTO: 0 /100 WBC (ref 0–0.2)
PLATELET # BLD AUTO: 165 K/UL (ref 164–446)
PMV BLD AUTO: 10.7 FL (ref 9–12.9)
POTASSIUM SERPL-SCNC: 4.3 MMOL/L (ref 3.6–5.5)
PROT SERPL-MCNC: 8.2 G/DL (ref 6–8.2)
RBC # BLD AUTO: 5.47 M/UL (ref 4.7–6.1)
SODIUM SERPL-SCNC: 135 MMOL/L (ref 135–145)
WBC # BLD AUTO: 9.4 K/UL (ref 4.8–10.8)

## 2025-03-07 PROCEDURE — 80053 COMPREHEN METABOLIC PANEL: CPT

## 2025-03-07 PROCEDURE — 82105 ALPHA-FETOPROTEIN SERUM: CPT

## 2025-03-07 PROCEDURE — 85025 COMPLETE CBC W/AUTO DIFF WBC: CPT

## 2025-03-07 PROCEDURE — 36415 COLL VENOUS BLD VENIPUNCTURE: CPT

## 2025-03-09 LAB — AFP-TM SERPL-MCNC: 7 NG/ML (ref 0–9)

## 2025-04-02 DIAGNOSIS — E11.9 TYPE 2 DIABETES MELLITUS WITHOUT COMPLICATION, WITHOUT LONG-TERM CURRENT USE OF INSULIN (HCC): ICD-10-CM

## 2025-04-02 RX ORDER — GLIPIZIDE 5 MG/1
5 TABLET, FILM COATED, EXTENDED RELEASE ORAL DAILY
Qty: 90 TABLET | Refills: 0 | Status: SHIPPED | OUTPATIENT
Start: 2025-04-02

## 2025-06-03 ENCOUNTER — APPOINTMENT (OUTPATIENT)
Dept: MEDICAL GROUP | Facility: PHYSICIAN GROUP | Age: 55
End: 2025-06-03
Payer: COMMERCIAL

## 2025-06-03 ENCOUNTER — HOSPITAL ENCOUNTER (OUTPATIENT)
Dept: LAB | Facility: MEDICAL CENTER | Age: 55
End: 2025-06-03
Attending: STUDENT IN AN ORGANIZED HEALTH CARE EDUCATION/TRAINING PROGRAM
Payer: COMMERCIAL

## 2025-06-03 VITALS
HEIGHT: 75 IN | WEIGHT: 246 LBS | DIASTOLIC BLOOD PRESSURE: 70 MMHG | OXYGEN SATURATION: 96 % | HEART RATE: 84 BPM | SYSTOLIC BLOOD PRESSURE: 116 MMHG | BODY MASS INDEX: 30.59 KG/M2 | TEMPERATURE: 97 F

## 2025-06-03 DIAGNOSIS — E11.9 TYPE 2 DIABETES MELLITUS WITHOUT COMPLICATION, WITHOUT LONG-TERM CURRENT USE OF INSULIN (HCC): Primary | ICD-10-CM

## 2025-06-03 DIAGNOSIS — Z23 NEED FOR VACCINATION: ICD-10-CM

## 2025-06-03 DIAGNOSIS — R18.8 CIRRHOSIS OF LIVER WITH ASCITES, UNSPECIFIED HEPATIC CIRRHOSIS TYPE (HCC): ICD-10-CM

## 2025-06-03 DIAGNOSIS — E66.811 CLASS 1 OBESITY DUE TO EXCESS CALORIES WITHOUT SERIOUS COMORBIDITY WITH BODY MASS INDEX (BMI) OF 30.0 TO 30.9 IN ADULT: ICD-10-CM

## 2025-06-03 DIAGNOSIS — E11.9 TYPE 2 DIABETES MELLITUS WITHOUT COMPLICATION, WITHOUT LONG-TERM CURRENT USE OF INSULIN (HCC): ICD-10-CM

## 2025-06-03 DIAGNOSIS — K74.60 CIRRHOSIS OF LIVER WITH ASCITES, UNSPECIFIED HEPATIC CIRRHOSIS TYPE (HCC): ICD-10-CM

## 2025-06-03 DIAGNOSIS — E66.09 CLASS 1 OBESITY DUE TO EXCESS CALORIES WITHOUT SERIOUS COMORBIDITY WITH BODY MASS INDEX (BMI) OF 30.0 TO 30.9 IN ADULT: ICD-10-CM

## 2025-06-03 LAB
CHOLEST SERPL-MCNC: 172 MG/DL (ref 100–199)
FASTING STATUS PATIENT QL REPORTED: NORMAL
HBA1C MFR BLD: 11.4 % (ref ?–5.8)
HDLC SERPL-MCNC: 32 MG/DL
LDLC SERPL CALC-MCNC: 95 MG/DL
POCT INT CON NEG: NEGATIVE
POCT INT CON POS: POSITIVE
TRIGL SERPL-MCNC: 226 MG/DL (ref 0–149)

## 2025-06-03 PROCEDURE — 90471 IMMUNIZATION ADMIN: CPT | Performed by: STUDENT IN AN ORGANIZED HEALTH CARE EDUCATION/TRAINING PROGRAM

## 2025-06-03 PROCEDURE — 80061 LIPID PANEL: CPT

## 2025-06-03 PROCEDURE — 3074F SYST BP LT 130 MM HG: CPT | Performed by: STUDENT IN AN ORGANIZED HEALTH CARE EDUCATION/TRAINING PROGRAM

## 2025-06-03 PROCEDURE — 99214 OFFICE O/P EST MOD 30 MIN: CPT | Mod: 25 | Performed by: STUDENT IN AN ORGANIZED HEALTH CARE EDUCATION/TRAINING PROGRAM

## 2025-06-03 PROCEDURE — 92250 FUNDUS PHOTOGRAPHY W/I&R: CPT | Mod: 26 | Performed by: STUDENT IN AN ORGANIZED HEALTH CARE EDUCATION/TRAINING PROGRAM

## 2025-06-03 PROCEDURE — 36415 COLL VENOUS BLD VENIPUNCTURE: CPT

## 2025-06-03 PROCEDURE — 90746 HEPB VACCINE 3 DOSE ADULT IM: CPT | Mod: JZ | Performed by: STUDENT IN AN ORGANIZED HEALTH CARE EDUCATION/TRAINING PROGRAM

## 2025-06-03 PROCEDURE — 3078F DIAST BP <80 MM HG: CPT | Performed by: STUDENT IN AN ORGANIZED HEALTH CARE EDUCATION/TRAINING PROGRAM

## 2025-06-03 PROCEDURE — 83036 HEMOGLOBIN GLYCOSYLATED A1C: CPT | Performed by: STUDENT IN AN ORGANIZED HEALTH CARE EDUCATION/TRAINING PROGRAM

## 2025-06-03 RX ORDER — GLIPIZIDE 10 MG/1
10 TABLET, FILM COATED, EXTENDED RELEASE ORAL DAILY
Qty: 100 TABLET | Refills: 3 | Status: SHIPPED | OUTPATIENT
Start: 2025-06-03 | End: 2026-07-08

## 2025-06-03 ASSESSMENT — ENCOUNTER SYMPTOMS
SHORTNESS OF BREATH: 0
PALPITATIONS: 0
CHILLS: 0
FEVER: 0

## 2025-06-03 ASSESSMENT — FIBROSIS 4 INDEX: FIB4 SCORE: 2.17

## 2025-06-03 NOTE — PROGRESS NOTES
"Subjective:   Verbal consent was acquired by the patient to use [employer] ambient listening note generation during this visit Yes     CC: Follow-up on diabetes     History of Present Illness  Mr. Billings is a pleasant 53 yo who presents for evaluation of diabetes.    He has been adhering to his prescribed medication regimen, taking the medication 30 minutes prior to breakfast daily. He reports abstaining from soda consumption and has made significant dietary modifications, including the exclusion of sweets. He expresses a desire to further reduce his blood sugar levels and discontinue all medications. He experiences gastrointestinal discomfort at times with his current medication, glipizide extended release, and is considering increasing the dosage to two tablets daily. His gastrointestinal health is reported as satisfactory.    Patient Active Problem List    Diagnosis Date Noted    Class 1 obesity due to excess calories without serious comorbidity with body mass index (BMI) of 30.0 to 30.9 in adult 06/03/2025    Noncompliance 01/09/2025    Portal venous hypertension (HCC) 10/11/2024    Calculus of gallbladder without cholecystitis without obstruction 07/10/2024    Type 2 diabetes mellitus without complication, without long-term current use of insulin (HCC) 04/08/2024    Thrombocytopenia, unspecified (HCC) 11/08/2023    Insomnia 11/08/2023    Abdominal pain, generalized 11/08/2023    Liver cirrhosis (HCC) 10/04/2022         Health Maintenance: Completed    ROS:  Review of Systems   Constitutional:  Negative for chills and fever.   Respiratory:  Negative for shortness of breath.    Cardiovascular:  Negative for chest pain and palpitations.       Objective:     Exam:  /70 (BP Location: Left arm, Patient Position: Sitting, BP Cuff Size: Adult)   Pulse 84   Temp 36.1 °C (97 °F) (Temporal)   Ht 1.905 m (6' 3\")   Wt 112 kg (246 lb)   SpO2 96%   BMI 30.75 kg/m²  Body mass index is 30.75 kg/m².    Physical " Exam  Constitutional:       Appearance: Normal appearance.   Eyes:      Extraocular Movements: Extraocular movements intact.   Cardiovascular:      Pulses:           Dorsalis pedis pulses are 2+ on the right side and 2+ on the left side.   Musculoskeletal:      Right foot: Normal range of motion. No deformity or bunion.      Left foot: Normal range of motion. No deformity or bunion.   Feet:      Right foot:      Protective Sensation: 5 sites tested.  5 sites sensed.      Skin integrity: Skin integrity normal. No ulcer, blister or skin breakdown.      Toenail Condition: Right toenails are normal.      Left foot:      Protective Sensation: 5 sites tested.  5 sites sensed.      Skin integrity: Skin integrity normal. No ulcer, blister or skin breakdown.      Toenail Condition: Left toenails are normal.   Neurological:      Mental Status: He is alert.   Psychiatric:         Mood and Affect: Mood normal.             Labs:   Lab Results   Component Value Date/Time    HBA1C 11.4 (A) 06/03/2025 07:24 AM          Assessment & Plan:     54 y.o. male with the following -     1. Type 2 diabetes mellitus without complication, without long-term current use of insulin (HCC) (Primary)  Chronic, uncontrolled.  A1c is 11.4%.  Patient is currently on glipizide 5 mg ER.  After shared decision making we will increase dose from 5 mg to 10 mg daily.  Monofilament and retinal screening completed today.  Patient is up-to-date on urine microalbumin.  Order for lipid panel provided.  - POCT  A1C  - Lipid Profile; Future  - glipiZIDE SR (GLUCOTROL) 10 MG TABLET SR 24 HR; Take 1 Tablet by mouth every day.  Dispense: 100 Tablet; Refill: 3  - Diabetic Monofilament LE Exam  - POCT Retinal Eye Exam    2. Cirrhosis of liver with ascites, unspecified hepatic cirrhosis type (HCC)  Chronic, stable.  Patient is established with gastroenterology.  Continue furosemide 40 mg daily, spironolactone 100 mg daily, and omeprazole 20 mg daily.    3. Class 1  obesity due to excess calories without serious comorbidity with body mass index (BMI) of 30.0 to 30.9 in adult  Chronic, uncontrolled.  BMI of 30.7.  Healthy lifestyle modifications encouraged.    4. Need for vaccination  - Hepatitis B Vaccine Adult 20+            Return in about 3 months (around 9/3/2025) for DM.    Please note that this dictation was created using voice recognition software. I have made every reasonable attempt to correct obvious errors, but I expect that there are errors of grammar and possibly content that I did not discover before finalizing the note.

## 2025-06-04 ENCOUNTER — RESULTS FOLLOW-UP (OUTPATIENT)
Dept: MEDICAL GROUP | Facility: PHYSICIAN GROUP | Age: 55
End: 2025-06-04

## 2025-06-06 LAB — RETINAL SCREEN: NEGATIVE
